# Patient Record
Sex: MALE | Race: WHITE | Employment: FULL TIME | URBAN - METROPOLITAN AREA
[De-identification: names, ages, dates, MRNs, and addresses within clinical notes are randomized per-mention and may not be internally consistent; named-entity substitution may affect disease eponyms.]

---

## 2019-01-03 PROBLEM — E11.9 CONTROLLED TYPE 2 DIABETES MELLITUS WITHOUT COMPLICATION, WITHOUT LONG-TERM CURRENT USE OF INSULIN (HCC): Status: ACTIVE | Noted: 2019-01-03

## 2021-02-23 ENCOUNTER — ANESTHESIA EVENT (OUTPATIENT)
Dept: SURGERY | Age: 56
End: 2021-02-23
Payer: COMMERCIAL

## 2021-02-24 ENCOUNTER — ANESTHESIA (OUTPATIENT)
Dept: SURGERY | Age: 56
End: 2021-02-24
Payer: COMMERCIAL

## 2021-02-24 ENCOUNTER — HOSPITAL ENCOUNTER (OUTPATIENT)
Age: 56
Setting detail: OUTPATIENT SURGERY
Discharge: HOME | End: 2021-02-24
Attending: ORTHOPAEDIC SURGERY | Admitting: ORTHOPAEDIC SURGERY
Payer: COMMERCIAL

## 2021-02-24 VITALS
SYSTOLIC BLOOD PRESSURE: 129 MMHG | DIASTOLIC BLOOD PRESSURE: 71 MMHG | HEART RATE: 76 BPM | OXYGEN SATURATION: 95 % | TEMPERATURE: 98.1 F | WEIGHT: 203 LBS | RESPIRATION RATE: 14 BRPM | BODY MASS INDEX: 27.53 KG/M2

## 2021-02-24 LAB
GLUCOSE BLD STRIP.AUTO-MCNC: 165 MG/DL (ref 65–100)
GLUCOSE BLD STRIP.AUTO-MCNC: 180 MG/DL (ref 65–100)
POTASSIUM BLD-SCNC: 3.8 MMOL/L (ref 3.5–5.1)

## 2021-02-24 PROCEDURE — 74011250636 HC RX REV CODE- 250/636: Performed by: ANESTHESIOLOGY

## 2021-02-24 PROCEDURE — 77030040361 HC SLV COMPR DVT MDII -B: Performed by: ORTHOPAEDIC SURGERY

## 2021-02-24 PROCEDURE — 77030003602 HC NDL NRV BLK BBMI -B: Performed by: NURSE ANESTHETIST, CERTIFIED REGISTERED

## 2021-02-24 PROCEDURE — 82962 GLUCOSE BLOOD TEST: CPT

## 2021-02-24 PROCEDURE — 77030039034 HC DIL ENDO DISP HEALICOIL SN -C: Performed by: ORTHOPAEDIC SURGERY

## 2021-02-24 PROCEDURE — 77030018673: Performed by: ORTHOPAEDIC SURGERY

## 2021-02-24 PROCEDURE — 77030010427: Performed by: ORTHOPAEDIC SURGERY

## 2021-02-24 PROCEDURE — 77030040936 HC WND COBLATN S&N -C: Performed by: ORTHOPAEDIC SURGERY

## 2021-02-24 PROCEDURE — 74011250636 HC RX REV CODE- 250/636: Performed by: ORTHOPAEDIC SURGERY

## 2021-02-24 PROCEDURE — C1713 ANCHOR/SCREW BN/BN,TIS/BN: HCPCS | Performed by: ORTHOPAEDIC SURGERY

## 2021-02-24 PROCEDURE — 2709999900 HC NON-CHARGEABLE SUPPLY: Performed by: ORTHOPAEDIC SURGERY

## 2021-02-24 PROCEDURE — 77030040922 HC BLNKT HYPOTHRM STRY -A: Performed by: ANESTHESIOLOGY

## 2021-02-24 PROCEDURE — 77030010430: Performed by: ORTHOPAEDIC SURGERY

## 2021-02-24 PROCEDURE — 77030027384 HC PRB ARTHSCP SERFAS STRY -C: Performed by: ORTHOPAEDIC SURGERY

## 2021-02-24 PROCEDURE — 77030002933 HC SUT MCRYL J&J -A: Performed by: ORTHOPAEDIC SURGERY

## 2021-02-24 PROCEDURE — 74011250637 HC RX REV CODE- 250/637: Performed by: ANESTHESIOLOGY

## 2021-02-24 PROCEDURE — 74011250636 HC RX REV CODE- 250/636: Performed by: NURSE ANESTHETIST, CERTIFIED REGISTERED

## 2021-02-24 PROCEDURE — 77030033005 HC TBNG ARTHSC PMP STRY -B: Performed by: ORTHOPAEDIC SURGERY

## 2021-02-24 PROCEDURE — 77030006668 HC BLD SHV MENSCS STRY -B: Performed by: ORTHOPAEDIC SURGERY

## 2021-02-24 PROCEDURE — 77030019605: Performed by: ORTHOPAEDIC SURGERY

## 2021-02-24 PROCEDURE — 77030010509 HC AIRWY LMA MSK TELE -A: Performed by: NURSE ANESTHETIST, CERTIFIED REGISTERED

## 2021-02-24 PROCEDURE — 76942 ECHO GUIDE FOR BIOPSY: CPT | Performed by: ORTHOPAEDIC SURGERY

## 2021-02-24 PROCEDURE — 77030020274 HC MISC IMPL ORTHOPEDIC: Performed by: ORTHOPAEDIC SURGERY

## 2021-02-24 PROCEDURE — 76010010054 HC POST OP PAIN BLOCK: Performed by: ORTHOPAEDIC SURGERY

## 2021-02-24 PROCEDURE — 84132 ASSAY OF SERUM POTASSIUM: CPT

## 2021-02-24 PROCEDURE — 76210000006 HC OR PH I REC 0.5 TO 1 HR: Performed by: ORTHOPAEDIC SURGERY

## 2021-02-24 PROCEDURE — 77030004453 HC BUR SHV STRY -B: Performed by: ORTHOPAEDIC SURGERY

## 2021-02-24 PROCEDURE — 74011000250 HC RX REV CODE- 250: Performed by: NURSE ANESTHETIST, CERTIFIED REGISTERED

## 2021-02-24 PROCEDURE — 77030003666 HC NDL SPINAL BD -A: Performed by: ORTHOPAEDIC SURGERY

## 2021-02-24 PROCEDURE — 77030019908 HC STETH ESOPH SIMS -A: Performed by: ANESTHESIOLOGY

## 2021-02-24 PROCEDURE — 76060000033 HC ANESTHESIA 1 TO 1.5 HR: Performed by: ORTHOPAEDIC SURGERY

## 2021-02-24 PROCEDURE — 74011636637 HC RX REV CODE- 636/637: Performed by: ANESTHESIOLOGY

## 2021-02-24 PROCEDURE — 74011250636 HC RX REV CODE- 250/636: Performed by: PHYSICIAN ASSISTANT

## 2021-02-24 PROCEDURE — 77030041183 HC KT ACCESS POS ACUFX SN -B: Performed by: ORTHOPAEDIC SURGERY

## 2021-02-24 PROCEDURE — 76210000021 HC REC RM PH II 0.5 TO 1 HR: Performed by: ORTHOPAEDIC SURGERY

## 2021-02-24 PROCEDURE — 77030002960 HC SUT PASS S&N -C: Performed by: ORTHOPAEDIC SURGERY

## 2021-02-24 PROCEDURE — 76010000161 HC OR TIME 1 TO 1.5 HR INTENSV-TIER 1: Performed by: ORTHOPAEDIC SURGERY

## 2021-02-24 DEVICE — HEALICOIL KNOTLESS RGNST
Type: IMPLANTABLE DEVICE | Site: SHOULDER | Status: FUNCTIONAL
Brand: HEALICOIL

## 2021-02-24 RX ORDER — PROPOFOL 10 MG/ML
INJECTION, EMULSION INTRAVENOUS AS NEEDED
Status: DISCONTINUED | OUTPATIENT
Start: 2021-02-24 | End: 2021-02-24 | Stop reason: HOSPADM

## 2021-02-24 RX ORDER — HYDROCODONE BITARTRATE AND ACETAMINOPHEN 5; 325 MG/1; MG/1
1 TABLET ORAL AS NEEDED
Status: DISCONTINUED | OUTPATIENT
Start: 2021-02-24 | End: 2021-02-24 | Stop reason: HOSPADM

## 2021-02-24 RX ORDER — EPHEDRINE SULFATE/0.9% NACL/PF 50 MG/5 ML
SYRINGE (ML) INTRAVENOUS AS NEEDED
Status: DISCONTINUED | OUTPATIENT
Start: 2021-02-24 | End: 2021-02-24 | Stop reason: HOSPADM

## 2021-02-24 RX ORDER — SODIUM CHLORIDE 0.9 % (FLUSH) 0.9 %
5-40 SYRINGE (ML) INJECTION EVERY 8 HOURS
Status: DISCONTINUED | OUTPATIENT
Start: 2021-02-24 | End: 2021-02-24 | Stop reason: HOSPADM

## 2021-02-24 RX ORDER — CEFAZOLIN SODIUM/WATER 2 G/20 ML
2 SYRINGE (ML) INTRAVENOUS ONCE
Status: COMPLETED | OUTPATIENT
Start: 2021-02-24 | End: 2021-02-24

## 2021-02-24 RX ORDER — LIDOCAINE HYDROCHLORIDE 10 MG/ML
0.1 INJECTION INFILTRATION; PERINEURAL AS NEEDED
Status: DISCONTINUED | OUTPATIENT
Start: 2021-02-24 | End: 2021-02-24 | Stop reason: HOSPADM

## 2021-02-24 RX ORDER — ONDANSETRON 2 MG/ML
INJECTION INTRAMUSCULAR; INTRAVENOUS AS NEEDED
Status: DISCONTINUED | OUTPATIENT
Start: 2021-02-24 | End: 2021-02-24 | Stop reason: HOSPADM

## 2021-02-24 RX ORDER — ACETAMINOPHEN 500 MG
1000 TABLET ORAL ONCE
Status: COMPLETED | OUTPATIENT
Start: 2021-02-24 | End: 2021-02-24

## 2021-02-24 RX ORDER — SODIUM CHLORIDE, SODIUM LACTATE, POTASSIUM CHLORIDE, CALCIUM CHLORIDE 600; 310; 30; 20 MG/100ML; MG/100ML; MG/100ML; MG/100ML
150 INJECTION, SOLUTION INTRAVENOUS CONTINUOUS
Status: DISCONTINUED | OUTPATIENT
Start: 2021-02-24 | End: 2021-02-24 | Stop reason: HOSPADM

## 2021-02-24 RX ORDER — HYDROMORPHONE HYDROCHLORIDE 2 MG/ML
0.5 INJECTION, SOLUTION INTRAMUSCULAR; INTRAVENOUS; SUBCUTANEOUS
Status: DISCONTINUED | OUTPATIENT
Start: 2021-02-24 | End: 2021-02-24 | Stop reason: HOSPADM

## 2021-02-24 RX ORDER — EPINEPHRINE 1 MG/ML
INJECTION, SOLUTION, CONCENTRATE INTRAVENOUS AS NEEDED
Status: DISCONTINUED | OUTPATIENT
Start: 2021-02-24 | End: 2021-02-24 | Stop reason: HOSPADM

## 2021-02-24 RX ORDER — DEXAMETHASONE SODIUM PHOSPHATE 4 MG/ML
INJECTION, SOLUTION INTRA-ARTICULAR; INTRALESIONAL; INTRAMUSCULAR; INTRAVENOUS; SOFT TISSUE AS NEEDED
Status: DISCONTINUED | OUTPATIENT
Start: 2021-02-24 | End: 2021-02-24 | Stop reason: HOSPADM

## 2021-02-24 RX ORDER — DEXAMETHASONE SODIUM PHOSPHATE 4 MG/ML
INJECTION, SOLUTION INTRA-ARTICULAR; INTRALESIONAL; INTRAMUSCULAR; INTRAVENOUS; SOFT TISSUE
Status: COMPLETED | OUTPATIENT
Start: 2021-02-24 | End: 2021-02-24

## 2021-02-24 RX ORDER — SODIUM CHLORIDE 9 MG/ML
50 INJECTION, SOLUTION INTRAVENOUS CONTINUOUS
Status: DISCONTINUED | OUTPATIENT
Start: 2021-02-24 | End: 2021-02-24 | Stop reason: HOSPADM

## 2021-02-24 RX ORDER — FAMOTIDINE 20 MG/1
20 TABLET, FILM COATED ORAL ONCE
Status: COMPLETED | OUTPATIENT
Start: 2021-02-24 | End: 2021-02-24

## 2021-02-24 RX ORDER — SODIUM CHLORIDE 0.9 % (FLUSH) 0.9 %
5-40 SYRINGE (ML) INJECTION AS NEEDED
Status: DISCONTINUED | OUTPATIENT
Start: 2021-02-24 | End: 2021-02-24 | Stop reason: HOSPADM

## 2021-02-24 RX ORDER — ACETAMINOPHEN 500 MG
1000 TABLET ORAL
Status: DISCONTINUED | OUTPATIENT
Start: 2021-02-24 | End: 2021-02-24 | Stop reason: HOSPADM

## 2021-02-24 RX ORDER — FENTANYL CITRATE 50 UG/ML
100 INJECTION, SOLUTION INTRAMUSCULAR; INTRAVENOUS ONCE
Status: COMPLETED | OUTPATIENT
Start: 2021-02-24 | End: 2021-02-24

## 2021-02-24 RX ORDER — LIDOCAINE HYDROCHLORIDE 20 MG/ML
INJECTION, SOLUTION EPIDURAL; INFILTRATION; INTRACAUDAL; PERINEURAL AS NEEDED
Status: DISCONTINUED | OUTPATIENT
Start: 2021-02-24 | End: 2021-02-24 | Stop reason: HOSPADM

## 2021-02-24 RX ORDER — MIDAZOLAM HYDROCHLORIDE 1 MG/ML
2 INJECTION, SOLUTION INTRAMUSCULAR; INTRAVENOUS
Status: COMPLETED | OUTPATIENT
Start: 2021-02-24 | End: 2021-02-24

## 2021-02-24 RX ADMIN — ACETAMINOPHEN 1000 MG: 500 TABLET ORAL at 09:03

## 2021-02-24 RX ADMIN — DEXAMETHASONE SODIUM PHOSPHATE 4 MG: 4 INJECTION, SOLUTION INTRAMUSCULAR; INTRAVENOUS at 10:11

## 2021-02-24 RX ADMIN — MIDAZOLAM 2 MG: 1 INJECTION INTRAMUSCULAR; INTRAVENOUS at 10:05

## 2021-02-24 RX ADMIN — LIDOCAINE HYDROCHLORIDE 100 MG: 20 INJECTION, SOLUTION EPIDURAL; INFILTRATION; INTRACAUDAL; PERINEURAL at 11:10

## 2021-02-24 RX ADMIN — ONDANSETRON 4 MG: 2 INJECTION INTRAMUSCULAR; INTRAVENOUS at 11:23

## 2021-02-24 RX ADMIN — ROPIVACAINE HYDROCHLORIDE 30 ML: 5 INJECTION, SOLUTION EPIDURAL; INFILTRATION; PERINEURAL at 10:11

## 2021-02-24 RX ADMIN — PROPOFOL 200 MG: 10 INJECTION, EMULSION INTRAVENOUS at 11:10

## 2021-02-24 RX ADMIN — INSULIN HUMAN 3 UNITS: 100 INJECTION, SOLUTION PARENTERAL at 09:24

## 2021-02-24 RX ADMIN — SODIUM CHLORIDE, SODIUM LACTATE, POTASSIUM CHLORIDE, AND CALCIUM CHLORIDE 150 ML/HR: 600; 310; 30; 20 INJECTION, SOLUTION INTRAVENOUS at 09:03

## 2021-02-24 RX ADMIN — DEXAMETHASONE SODIUM PHOSPHATE 5 MG: 4 INJECTION, SOLUTION INTRAMUSCULAR; INTRAVENOUS at 11:23

## 2021-02-24 RX ADMIN — CEFAZOLIN 2 G: 1 INJECTION, POWDER, FOR SOLUTION INTRAVENOUS at 11:25

## 2021-02-24 RX ADMIN — Medication 10 MG: at 11:31

## 2021-02-24 RX ADMIN — FENTANYL CITRATE 100 MCG: 50 INJECTION, SOLUTION INTRAMUSCULAR; INTRAVENOUS at 10:05

## 2021-02-24 RX ADMIN — FAMOTIDINE 20 MG: 20 TABLET, FILM COATED ORAL at 09:03

## 2021-02-24 NOTE — PROGRESS NOTES
's pre-procedure visit requested by patient. Conveyed care and concern for patient and family. Offered prayer as requested for patient, family, and staff.     Marvin Dias MDiv, BS  Board Certified

## 2021-02-24 NOTE — PERIOP NOTES
PACU DISCHARGE NOTE  Vital signs stable, pain well controlled, alert and oriented times three or at baseline, no anesthetic complications. IV removed with catheter tip intact. patient dressed with sling over clothing per patient preference. Written and verbal discharge instructions given, including pain control, dressing care and follow up appointment. Oralia Del Valle verbalized understanding and signed discharge instructions. All questions answered prior to discharge. Dr Michael Thayer okay to discharge at this time. Pt and all belongings taken via wheelchair and safely put in vehicle.

## 2021-02-24 NOTE — H&P
Outpatient Surgery History and Physical:  Jonny Ratliff was seen and examined. CHIEF COMPLAINT:   Left shoulder pain. PE:     Visit Vitals  /70 (BP 1 Location: Right upper arm, BP Patient Position: At rest)   Pulse 73   Temp 98.1 °F (36.7 °C)   Resp 17   Wt 92.1 kg (203 lb)   SpO2 99%   BMI 27.53 kg/m²       Heart:   Regular rhythm      Lungs:  Are clear      Past Medical History:    Patient Active Problem List    Diagnosis    Controlled type 2 diabetes mellitus without complication, without long-term current use of insulin (HCC)    Hyperlipidemia    HTN (hypertension)       Surgical History:   Past Surgical History:   Procedure Laterality Date    HX COLONOSCOPY  last 4/4/16    Agnieszka--no polyps--7-10 year recall    HX LIPOMA RESECTION      arm and neck       Social History: Patient  reports that he has never smoked. He has never used smokeless tobacco. He reports that he does not drink alcohol or use drugs. Family History:   Family History   Problem Relation Age of Onset    Hypertension Mother     Cancer Father         liver    Hypertension Sister     Cancer Maternal Aunt         breast 62s    Cancer Maternal Grandmother         liver    Cancer Maternal Aunt         pancreatic    Heart Disease Neg Hx     Diabetes Neg Hx        Allergies: Reviewed per EMR  Allergies   Allergen Reactions    Jardiance [Empagliflozin] Other (comments)     Severe UTI    Metformin Diarrhea     PT DENIES       Medications:    No current facility-administered medications on file prior to encounter. Current Outpatient Medications on File Prior to Encounter   Medication Sig    meloxicam (MOBIC) 7.5 mg tablet Take 1 Tab by mouth two (2) times a day. (Patient taking differently: Take 7.5 mg by mouth daily.)    atorvastatin (LIPITOR) 10 mg tablet Take 1 Tab by mouth daily.  hydroCHLOROthiazide (HYDRODIURIL) 25 mg tablet Take 1 Tab by mouth daily.     metFORMIN ER (GLUCOPHAGE XR) 500 mg tablet Take 3 Tabs by mouth daily (with dinner). (Patient taking differently: Take 1,500 mg by mouth daily.)    liraglutide (VICTOZA) 0.6 mg/0.1 mL (18 mg/3 mL) pnij 1.8 mg by SubCUTAneous route nightly. Indications: type 2 diabetes mellitus    Insulin Needles, Disposable, (Ayana Pen Needle) 32 gauge x 5/32\" ndle 0.3 cc of victoza a day    valsartan (DIOVAN) 160 mg tablet Take 1 Tab by mouth daily.  aspirin delayed-release 81 mg tablet Take  by mouth daily.  sildenafil citrate (VIAGRA) 100 mg tablet Take one a day as needed       The surgery is planned for the left shoulder arthroscopy rotator cuff repair. History and physical has been reviewed. The patient has been examined. There have been no significant clinical changes since the completion of the originally dated History and Physical.  Patient identified by surgeon; surgical site was confirmed by patient and surgeon. The patient is here today for outpatient surgery. I have examined the patient, no changes are noted in the patient's medical status. Necessity for the procedure/care is still present and the history and physical above is current. See the office notes for the full long term history of the problem. Please see the recent office notes for the musculoskeletal examination.     Signed By: ELISA Lopez     February 24, 2021 9:33 AM

## 2021-02-24 NOTE — ANESTHESIA PROCEDURE NOTES
Peripheral Block    Start time: 2/24/2021 10:05 AM  End time: 2/24/2021 10:11 AM  Performed by: Clau Moore MD  Authorized by: Clau Moore MD       Pre-procedure: Indications: at surgeon's request and post-op pain management    Preanesthetic Checklist: patient identified, risks and benefits discussed, site marked, timeout performed, anesthesia consent given and patient being monitored    Timeout Time: 10:05          Block Type:   Block Type: Interscalene  Laterality:  Left  Monitoring:  Standard ASA monitoring, responsive to questions, oxygen, continuous pulse ox, frequent vital sign checks and heart rate  Injection Technique:  Single shot  Procedures: ultrasound guided and nerve stimulator    Patient Position: seated (lateral decubitus)  Prep: chlorhexidine    Location:  Interscalene  Needle Type:  Stimuplex  Needle Gauge:  22 G  Needle Localization:  Nerve stimulator and ultrasound guidance  Motor Response comment:   Motor Response: minimal motor response >0.4 mA   Medication Injected:  Ropivacaine 0.5% with epinephrine 1:200,000 injection, 30 mL (Mixture components: EPINEPHrine HCl (PF) 1 mg/mL (1 mL) Soln, . 005 mL; ropivacaine (PF) 5 mg/mL (0.5 %) Soln, 1 mL)  dexamethasone (DECADRON) 4 mg/mL injection, 4 mg  Med Admin Time: 2/24/2021 10:11 AM    Assessment:  Number of attempts:  1  Injection Assessment:  Incremental injection every 5 mL, local visualized surrounding nerve on ultrasound, negative aspiration for blood, no intravascular symptoms, no paresthesia and ultrasound image on chart  Patient tolerance:  Patient tolerated the procedure well with no immediate complications  All needles out intact, proc. Tolerated well.

## 2021-02-24 NOTE — ANESTHESIA POSTPROCEDURE EVALUATION
Procedure(s):  LEFT SHOULDER ARTHROSCOPY ROTATOR CUFF REPAIR. general    Anesthesia Post Evaluation      Multimodal analgesia: multimodal analgesia used between 6 hours prior to anesthesia start to PACU discharge  Patient location during evaluation: bedside  Patient participation: complete - patient participated  Level of consciousness: awake and alert  Pain management: adequate  Airway patency: patent  Anesthetic complications: no  Cardiovascular status: hemodynamically stable  Respiratory status: spontaneous ventilation  Hydration status: euvolemic  Comments: Patient stable and may discharge at this time. Post anesthesia nausea and vomiting:  none  Final Post Anesthesia Temperature Assessment:  Normothermia (36.0-37.5 degrees C)    Good result with interscalene nerve block.     INITIAL Post-op Vital signs:   Vitals Value Taken Time   /75 02/24/21 1303   Temp 36.8 °C (98.2 °F) 02/24/21 1223   Pulse 80 02/24/21 1303   Resp 13 02/24/21 1303   SpO2 94 % 02/24/21 1303

## 2021-02-24 NOTE — ANESTHESIA PREPROCEDURE EVALUATION
Anesthetic History   No history of anesthetic complications       Comments: Slow awakening     Review of Systems / Medical History  Patient summary reviewed and pertinent labs reviewed    Pulmonary  Within defined limits                 Neuro/Psych   Within defined limits           Cardiovascular    Hypertension: well controlled          Hyperlipidemia    Exercise tolerance: >4 METS     GI/Hepatic/Renal  Within defined limits              Endo/Other    Diabetes: type 2    Obesity     Other Findings              Physical Exam    Airway  Mallampati: II  TM Distance: > 6 cm  Neck ROM: normal range of motion   Mouth opening: Normal     Cardiovascular  Regular rate and rhythm,  S1 and S2 normal,  no murmur, click, rub, or gallop    Rate: normal         Dental  No notable dental hx       Pulmonary  Breath sounds clear to auscultation               Abdominal         Other Findings            Anesthetic Plan    ASA: 2  Anesthesia type: general      Post-op pain plan if not by surgeon: peripheral nerve block single    Induction: Intravenous  Anesthetic plan and risks discussed with: Patient and Spouse      covid in dec.

## 2021-02-25 NOTE — OP NOTES
[facility]  OPERATIVE REPORT    Name:  Catalina Shaikh  MR#:  [id number]  :  1965  ACCOUNT #:  [de-identified]  DATE OF SERVICE:  2021    PREOPERATIVE DIAGNOSES:  1. Rotator cuff tear. 2.  Acromioclavicular arthritis. 3.  Impingement, left shoulder. POSTOPERATIVE DIAGNOSES:  1. Rotator cuff tear. 2.  Acromioclavicular arthritis. 3.  Impingement, left shoulder. PROCEDURE PERFORMED:  1. Arthroscopic rotator cuff repair - CPT 65909.  2.  Arthroscopic distal clavicle excision (Harbert procedure) - CPT 88584.  3.  Arthroscopic subacromial decompression - CPT 93248, left shoulder. SURGEON:  Irene Vernon MD    ASSISTANT:  ELISA Trujillo    ANESTHESIA:  General.    FLUIDS:  Crystalloid. COMPLICATIONS:  None. SPECIMENS REMOVED:  None. IMPLANTS:  Yes, see record. ESTIMATED BLOOD LOSS:  Minimal.    FINDINGS:  There was a high-grade partial tear with a near full-thickness component of the supraspinatus in a 1-cm area with tearing on both the bursal and articular surfaces. There was an anterior-inferior acromial slope. There was undersurface osteophyte formation and degenerative change of the distal clavicle at the [facility] joint. No other abnormalities were seen. PROCEDURE:  After informed consent, the patient was brought to the operating room and placed on the operating room table in the supine position. General anesthesia was administered without difficulty. The patient was placed in the lateral decubitus position. All pressure points were inspected and padded appropriately. The left upper extremity was suspended by traction. The left shoulder was prepped and draped in sterile fashion. Glenohumeral joint was insufflated with 50 mL of sterile saline and a posterior portal was established. Glenohumeral joint was then arthroscoped in a sequential manner. The aforementioned findings were noted. An anterior portal was established.   Undersurface rotator cuff tearing was debrided back to smooth stable area. Scope was brought into the subacromial space. A lateral portal was established. Bursal proliferative tissue was excised. The undersurface of the acromion was exposed and an acromioplasty was performed. All of the acromion anterior to the leading edge of the distal clavicle was excised. A depth of 5-6 mm in a 2-cm anterior to posterior direction was removed. This opened up the subacromial space nicely. Attention was then directed to the distal clavicle. Through both the anterior and lateral portals, a circumferential distal clavicle excision was performed. A 10 mm of distal clavicle was excised. Circumferential excision was verified arthroscopically. The Shalonda procedure was performed without difficulty. Attention was then directed to the rotator cuff. The high-grade partial tear was converted to a full-thickness tear. The area underneath the original insertion was lightly decorticated. A HealiCoil anchor and two tape suture in a mattress fashion were used to anatomically repair the tear. Complete repair of the rotator cuff was performed. The rotator cuff tear was repaired without difficulty. The shoulder was thoroughly irrigated. Portals were closed. Sterile dressings were applied. The patient was taken to the recovery room in stable condition. ELISA Loco, assisted during the procedure. He was necessary for patient positioning, wound closure, and assistance with the major portions of the operation. His presence decreased the operative time and potential complication rate.       MD TIM [id number]  D:  02/25/2021 8:33  T:  02/25/2021 9:08  JOB #:  [id number]

## 2021-03-08 ENCOUNTER — HOSPITAL ENCOUNTER (OUTPATIENT)
Dept: PHYSICAL THERAPY | Age: 56
Discharge: HOME | End: 2021-03-08
Payer: COMMERCIAL

## 2021-03-08 PROCEDURE — 97161 PT EVAL LOW COMPLEX 20 MIN: CPT

## 2021-03-08 PROCEDURE — 97110 THERAPEUTIC EXERCISES: CPT

## 2021-03-08 NOTE — THERAPY EVALUATION
Lynne Harmon  : 1965  Primary: Scarlet Payan Of Shawna Hanks*  Secondary:  Omid Castro @ [address], [place].  [phone]   [phone]       OUTPATIENT PHYSICAL THERAPY:Initial Assessment 3/8/2021   ICD-10: Treatment Diagnosis: Pain in left shoulder (M25.512) and Stiffness of left shoulder, not elsewhere classified (M25.612)  Precautions/Allergies:   Jardiance [empagliflozin] and Metformin     Ambulatory/Rehab Services H2 Model Falls Risk Assessment    Risk Factors:       (1)  Gender [Male] Ability to Rise from Chair:       (0)  Ability to rise in a single movement    Falls Prevention Plan:       Physical Limitations to Exercise (specify):  precautions s/p L RCR   Total: (5 or greater = High Risk): 1     [facility] [employer]. All Rights Reserved. [facility] Center[facility] Patent #[id number]. Federal Law prohibits the replication, distribution or use without written permission from [employer]      MEDICAL/REFERRING DIAGNOSIS:  Status post left rotator cuff repair [Z98.890]   DATE OF SURGERY: 21  REFERRING PHYSICIAN: Lin Sandifer, MD MD Orders: evaluate and treat  Return MD Appointment: 3/11/21   INITIAL ASSESSMENT:  Mr. Jarocho Bernardo presents with impaired strength, ROM and pain of left shoulder  secondary to RCR. This limits reaching and lifting tolerance and restricts ability to participate in ADLs and recreational activities. . Patient would benefit from skilled physical therapy to address above impairments. PROBLEM LIST (Impacting functional limitations):  1. Decreased Strength  2. Decreased ADL/Functional Activities  3. Increased Pain  4. Decreased Activity Tolerance  5. Decreased Flexibility/Joint Mobility INTERVENTIONS PLANNED: (Treatment may consist of any combination of the following)  1. Cryotherapy  2. Electrical Stimulation  3. Home Exercise Program (HEP)  4. Manual Therapy  5.  Neuromuscular Re-education/Strengthening  6. Therapeutic Activites  7. Therapeutic Exercise/Strengthening   TREATMENT PLAN:  Effective Dates: 3/8/2021 TO 6/6/2021 (90 days). Frequency/Duration: 2 times a week for 90 Day(s)  GOALS: (Goals have been discussed and agreed upon with patient.)  Short-Term Functional Goals: Time Frame: 4 weeks  # Goal Status   1 Pt will confirm compliance with home program to facilitate improvement in function. NEW     Discharge goals: Time frame: 12 weeks   # Goal Status   1 Pt will be able to elevate UE to at least 130° without symptoms in order to participate in ADLs such as reaching overhead and pulling shirt on. NEW   2 Pt will be able to reach over head to touch upper back without symptoms to be able to participate in ADLs such as reaching overhead and washing head. NEW   3 Pt will be able to reach across body to touch spine of opposite scapula without symptoms to be able to participate in ADLs such as reaching tasks. NEW   4 Pt will be able to reach behind back to touch lumbar region without symptoms to be able to participate in dressing and toileting activities. NEW         Rehabilitation Potential For Stated Goals: Good  Regarding Minor العراقي's therapy, I certify that the treatment plan above will be carried out by a therapist or under their direction. Thank you for this referral,  Isaiah Art, PT, DPT     Referring Physician Signature: Sakina Tovar MD _______________________________ Date _____________     HISTORY:   History of Injury/Illness (Reason for Referral):  Pt reports he has had L shoulder pain for years. It has mostly affected his clavicular region and he had bone scan to screen for tumor but this was negative. He continued to have pain and eventually had MRI after seeing ortho. MRI revealed RC tear and he had surgery on 2/24/21. He reports he used ice machine and pain medication for first 3-4 days and then weaned from them both and has done well since.  He is able to don/doff sling with no assistance though requires help dressing still. He denies radicular symptoms in L UE. 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 He would like to get back to normal ADLs, golf, and working out. Past Medical History/Comorbidities:   Mr. Mali Rock  has a past medical history of Diabetes ([address] (dx 2012), Diverticulosis of large intestine without diverticulitis (2016), Elevated PSA, Hypertension, and Obesity (BMI 30-39.9) (30.6). Mr. Mali Rock  has a past surgical history that includes hx lipoma resection and hx colonoscopy (last 4/4/16). Social History/Living Environment:     Pt lives alone in [facility]. Prior Level of Function/Work/Activity:  Pt works full time at a desk job. Pt had limited prior level of function. Current Medications:       Current Outpatient Medications:     naproxen sodium (Aleve) 220 mg cap, Take  by mouth., Disp: , Rfl:     meloxicam (MOBIC) 7.5 mg tablet, Take 1 Tab by mouth two (2) times a day. (Patient taking differently: Take 7.5 mg by mouth daily.), Disp: 60 Tab, Rfl: 0    atorvastatin (LIPITOR) 10 mg tablet, Take 1 Tab by mouth daily. , Disp: 90 Tab, Rfl: 3    hydroCHLOROthiazide (HYDRODIURIL) 25 mg tablet, Take 1 Tab by mouth daily. , Disp: 90 Tab, Rfl: 3    metFORMIN ER (GLUCOPHAGE XR) 500 mg tablet, Take 3 Tabs by mouth daily (with dinner). (Patient taking differently: Take 1,500 mg by mouth daily.), Disp: 270 Tab, Rfl: 1    liraglutide (VICTOZA) 0.6 mg/0.1 mL (18 mg/3 mL) pnij, 1.8 mg by SubCUTAneous route nightly. Indications: type 2 diabetes mellitus, Disp: 27 mL, Rfl: 1    Insulin Needles, Disposable, (Ayana Pen Needle) 32 gauge x 5/32\" ndle, 0.3 cc of victoza a day, Disp: 100 Pen Needle, Rfl: 3    valsartan (DIOVAN) 160 mg tablet, Take 1 Tab by mouth daily. , Disp: 90 Tab, Rfl: 3    sildenafil citrate (VIAGRA) 100 mg tablet, Take one a day as needed, Disp: 30 Tab, Rfl: 2    aspirin delayed-release 81 mg tablet, Take  by mouth daily. , Disp: , Rfl:    Date Last Reviewed:  3/8/2021    Number of Personal Factors/Comorbidities that affect the Plan of Care: 0: LOW COMPLEXITY   EXAMINATION:   Pain at worst: 10/10  Observation: Incisions healing well. Pt wearing sling with abduction pillow. ROM:    Left Right   Elevation (°) Passive flexion to ~60 ° per visual estimation 133   Behind neck reach Not tested To upper back   Behind back reach Not tested To upper lumbar region   Cross body reach Not tested To opposite scapula    R IR and ER WNL and pain-free. L IR to stomach, L ER to neutral.     UE ANDT: median-bias (+) L  Strength: 5/5 throughout on R. L not tested. Body Structures Involved:  1. Nerves  2. Joints  3. Muscles Body Functions Affected:  1. Sensory/Pain  2. Neuromusculoskeletal  3. Movement Related Activities and Participation Affected:  1. General Tasks and Demands  2. Self Care  3. Community, Social and [place]   Number of elements (examined above) that affect the Plan of Care: 1-2: LOW COMPLEXITY   CLINICAL PRESENTATION:   Presentation: Stable and uncomplicated: LOW COMPLEXITY     CLINICAL DECISION MAKING:      OUTCOME MEASURE:   Initial outcome measure performed on 3/8/2021. Tool Used: Disabilities of the Arm, Shoulder and Hand (DASH) Questionnaire - Quick Version  Score:  Initial: 40/55  Most Recent: X/55 (Date: -- )   Interpretation of Score: The DASH is designed to measure the activities of daily living in person's with upper extremity dysfunction or pain. Each section is scored on a 1-5 scale, 5 representing the greatest disability. The scores of each section are added together for a total score of 55. MEDICAL NECESSITY:   · Patient will benefit from skilled physical therapy to address their previously listed impairments in order to improve their independence with functional activities painfree.   REASON FOR SERVICES/OTHER COMMENTS:  · Patient requires present interventions due to patient's inability to perform functional and recreational activities painfree.    Use of outcome tool(s) and clinical judgement create a POC that gives a: Clear prediction of patient's progress: LOW COMPLEXITY        Total Duration: 38 min  PT Patient Time In/Time Out  Time In: 1530  Time Out: [address], PT, DPT

## 2021-03-08 NOTE — PROGRESS NOTES
Tamika Finley  : 1965  Primary: Adela Rain Of Shawna Hanks*  Secondary:  [address] @ [address], [place].  [phone]   [phone]      OUTPATIENT PHYSICAL THERAPY: Daily Treatment Note 3/8/2021  Date of surgery: 21  Visit Count:  1    ICD-10: Treatment Diagnosis: Pain in left shoulder (M25.512) and Stiffness of left shoulder, not elsewhere classified (M25.612)  TREATMENT PLAN:  Effective Dates: 3/8/2021 TO 2021 (90 days). Frequency/Duration: 2 times a week for 90 Day(s)  GOALS: (Goals have been discussed and agreed upon with patient.)  Short-Term Functional Goals: Time Frame: 4 weeks  # Goal Status   1 Pt will confirm compliance with home program to facilitate improvement in function. NEW     Discharge goals: Time frame: 12 weeks   # Goal Status   1 Pt will be able to elevate UE to at least 130° without symptoms in order to participate in ADLs such as reaching overhead and pulling shirt on. NEW   2 Pt will be able to reach over head to touch upper back without symptoms to be able to participate in ADLs such as reaching overhead and washing head. NEW   3 Pt will be able to reach across body to touch spine of opposite scapula without symptoms to be able to participate in ADLs such as reaching tasks. NEW   4 Pt will be able to reach behind back to touch lumbar region without symptoms to be able to participate in dressing and toileting activities. NEW          Pre-treatment Symptoms/Complaints:  Pt reports shoulder feels okay at rest though painful with movement. Pain: Initial:   3/10 Post Session:  No increase   Medications Last Reviewed: 3/8/2021  Updated Objective Findings: Below measures from initial evaluation unless otherwise noted. 3/8/21  Pain at worst: 10/10  Observation: Incisions healing well. Pt wearing sling with abduction pillow.   ROM:    Left Right   Elevation (°) Passive flexion to ~60 ° per visual estimation 133   Behind neck reach Not tested To upper back   Behind back reach Not tested To upper lumbar region   Cross body reach Not tested To opposite scapula    R IR and ER WNL and pain-free. L IR to stomach, L ER to neutral.     UE ANDT: median-bias (+) L  Strength: 5/5 throughout on R. L not tested. DASH: 40/55. TREATMENT:   THERAPEUTIC ACTIVITY: (see chart for minutes): Therapeutic activities per grid below to improve mobility, strength, balance and coordination. Required minimal visual, verbal and tactile cues to improve independence with functional mobility and activities. THERAPEUTIC EXERCISE: (see chart for minutes):  Exercises per grid below to improve mobility, strength, balance and coordination. Required minimal visual, verbal and tactile cues to promote proper body alignment and promote proper body mechanics. Progressed resistance, range, repetitions and complexity of movement as indicated. NEUROMUSCULAR RE-EDUCATION: (see chart for minutes):  Exercise/activities per grid below to improve balance, coordination, kinesthetic sense, posture, pain, and proprioception. Required minimal visual, verbal and tactile cues to promote motor control of left, upper extremity(s). MANUAL THERAPY: (see chart for minutes): Joint mobilization, Soft tissue mobilization, skin mobilization, and muscle energy techniques were utilized and necessary because of the patient's restricted joint motion, restricted motion of soft tissue and pain . MODALITIES: (see chart for minutes):      *  Cold Pack Therapy in order to provide analgesia and reduce inflammation and edema.      Date: 3/8/21 (visit 1)       Modalities:                Therapeutic Exercise: 23 min        Pendulum: x10 CW/CCW  Elbow flexion/extension: x10 L  Shoulder circles: x10 forwards and backwards   Pulley flexion: x10   Shoulder isometric: x1 hold all directions   PROM: x5' into ER and flexion   Pt educated on home program implementation and given printout. Neuromuscular re-education                Manual Therapy:                Therapeutic Activities:                  Home program: 3/8/21: pendulum, pulley, elbow flexion/extension, shoulder circles, shoulder isometrics    MedBridge Portal  Treatment/Session Summary:    · Response to Treatment: Pt tolerated exercises well and reported he understood them all. · Communication/Consultation:  None today  · Equipment provided today: 3/8/21: doorway pulley  · Recommendations/Intent for next treatment session: Next visit will focus on improving L shoulder pain and ROM.     Total Treatment Billable Duration:  38 minutes  PT Patient Time In/Time Out  Time In: 4595  Time Out: 7400 STAN Gonzalez, PT, DPT    Future Appointments   Date Time Provider Margo Cheni   3/12/2021  2:00 PM Vi De [facility]   3/16/2021 10:15 AM Vi [facility]   3/18/2021 10:15 AM Vi HODGESOFR [facility]   3/23/2021 10:15 AM Vi HODGESOFMARY [facility]   3/25/2021 10:15 AM Vi HODGESOFR [facility]   3/30/2021 10:15 AM Vi HODGESOFR [facility]   4/2/2021  8:00 AM Rodriguez Lal MD [facility]

## 2021-03-12 ENCOUNTER — HOSPITAL ENCOUNTER (OUTPATIENT)
Dept: PHYSICAL THERAPY | Age: 56
Discharge: HOME | End: 2021-03-12
Payer: COMMERCIAL

## 2021-03-12 PROCEDURE — 97016 VASOPNEUMATIC DEVICE THERAPY: CPT

## 2021-03-12 PROCEDURE — 97110 THERAPEUTIC EXERCISES: CPT

## 2021-03-12 NOTE — PROGRESS NOTES
Mark Newman  : 1965  Primary: Amanda Robert Of [facility]a [facility]anks*  Secondary:  Nohemi Lynn @ [address], [place].  [phone]   [phone]      OUTPATIENT PHYSICAL THERAPY: Daily Treatment Note 3/12/2021  Date of surgery: 21  Visit Count:  2    ICD-10: Treatment Diagnosis: Pain in left shoulder (M25.512) and Stiffness of left shoulder, not elsewhere classified (M25.612)  TREATMENT PLAN:  Effective Dates: 3/8/2021 TO 2021 (90 days). Frequency/Duration: 2 times a week for 90 Day(s)  GOALS: (Goals have been discussed and agreed upon with patient.)  Short-Term Functional Goals: Time Frame: 4 weeks  # Goal Status   1 Pt will confirm compliance with home program to facilitate improvement in function. NEW     Discharge goals: Time frame: 12 weeks   # Goal Status   1 Pt will be able to elevate UE to at least 130° without symptoms in order to participate in ADLs such as reaching overhead and pulling shirt on. NEW   2 Pt will be able to reach over head to touch upper back without symptoms to be able to participate in ADLs such as reaching overhead and washing head. NEW   3 Pt will be able to reach across body to touch spine of opposite scapula without symptoms to be able to participate in ADLs such as reaching tasks. NEW   4 Pt will be able to reach behind back to touch lumbar region without symptoms to be able to participate in dressing and toileting activities. NEW          Pre-treatment Symptoms/Complaints:  Pt reports he saw MD earlier this week who removed the steri strips and told him to wean from abduction pillow. Pain: Initial:   Pt reports shoulder is a little sore but generally feels okay while in sling Post Session:  No increase   Medications Last Reviewed: 3/12/2021  Updated Objective Findings: Below measures from initial evaluation unless otherwise noted. 3/8/21  Pain at worst: 10/10  Observation: Incisions healing well.  Pt wearing sling with abduction pillow. ROM:    Left Right   Elevation (°) Passive flexion to ~60 ° per visual estimation 133   Behind neck reach Not tested To upper back   Behind back reach Not tested To upper lumbar region   Cross body reach Not tested To opposite scapula    R IR and ER WNL and pain-free. L IR to stomach, L ER to neutral.     UE ANDT: median-bias (+) L  Strength: 5/5 throughout on R. L not tested. DASH: 40/55. TREATMENT:   THERAPEUTIC ACTIVITY: (see chart for minutes): Therapeutic activities per grid below to improve mobility, strength, balance and coordination. Required minimal visual, verbal and tactile cues to improve independence with functional mobility and activities. THERAPEUTIC EXERCISE: (see chart for minutes):  Exercises per grid below to improve mobility, strength, balance and coordination. Required minimal visual, verbal and tactile cues to promote proper body alignment and promote proper body mechanics. Progressed resistance, range, repetitions and complexity of movement as indicated. NEUROMUSCULAR RE-EDUCATION: (see chart for minutes):  Exercise/activities per grid below to improve balance, coordination, kinesthetic sense, posture, pain, and proprioception. Required minimal visual, verbal and tactile cues to promote motor control of left, upper extremity(s). MANUAL THERAPY: (see chart for minutes): Joint mobilization, Soft tissue mobilization, skin mobilization, and muscle energy techniques were utilized and necessary because of the patient's restricted joint motion, restricted motion of soft tissue and pain . MODALITIES: (see chart for minutes):      *  Cold Pack Therapy in order to provide analgesia and reduce inflammation and edema.      Date: 3/8/21 (visit 1) 3/12/21 (visit 2)       Modalities:  15 min      Game ready to L shoulder, medium compression, coldest setting   15'      Therapeutic Exercise: 23 min 35 min       Pendulum: x10 CW/CCW  Elbow flexion/extension: x10 L  Shoulder circles: x10 forwards and backwards   Pulley flexion: x10   Shoulder isometric: x1 hold all directions   PROM: x5' into ER and flexion   Pt educated on home program implementation and given printout. Pendulum: x10 CW/CCW  Elbow flexion/extension: 2x10 L  Shoulder circles: 2x10  Pulley flexion: 2'   Shoulder isometric: 2x1' hold all directions   PROM: x10' into ER and flexion       Neuromuscular re-education                Manual Therapy:                Therapeutic Activities:                  Home program: 3/8/21: pendulum, pulley, elbow flexion/extension, shoulder circles, shoulder isometrics    MedBridge Portal  Treatment/Session Summary:    · Response to Treatment: Pt tolerated exercises well and weaning from abduction pillow was discussed. · Communication/Consultation:  None today  · Equipment provided today: 3/8/21: doorway pulley  · Recommendations/Intent for next treatment session: Next visit will focus on improving L shoulder pain and ROM.     Total Treatment Billable Duration: 50 minutes  PT Patient Time In/Time Out  Time In: 1400  Time Out: Hernandez Roberts PT, DPT    Future Appointments   Date Time Provider Margo Leo   3/16/2021 10:15 AM Amada Daniela [facility]   3/18/2021 10:15 AM Amada Daniela [facility]   3/23/2021 10:15 AM Amada Daniela [facility]   3/25/2021 10:15 AM Amada Daniela [facility]   3/30/2021 10:15 AM Amada Daniela [facility]   4/1/2021  9:30 AM Amada Daniela [facility]   4/2/2021  8:00 AM Jaiden Lal MD [facility]   4/6/2021 10:15 AM Amada Daniela [facility]   4/8/2021 10:15 AM Amada Daniela [facility]   4/13/2021 11:00 AM Amada Daniela [facility]   4/15/2021 11:00 AM Amada Daniela [facility]   4/20/2021 10:15 AM Amada Daniela [facility]   4/22/2021  9:00 AM ELISA Anthony [facility]   4/22/2021 10:15 AM Amada Suarez [facility]   4/27/2021 10:15 AM Amada Suarez [facility] 4/29/2021 10:15 AM Kenn Lew [facility]

## 2021-03-16 ENCOUNTER — HOSPITAL ENCOUNTER (OUTPATIENT)
Dept: PHYSICAL THERAPY | Age: 56
Discharge: HOME | End: 2021-03-16
Payer: COMMERCIAL

## 2021-03-16 PROCEDURE — 97016 VASOPNEUMATIC DEVICE THERAPY: CPT

## 2021-03-16 PROCEDURE — 97110 THERAPEUTIC EXERCISES: CPT

## 2021-03-16 NOTE — PROGRESS NOTES
Sher Rivera  : 1965  Primary: Inocencio Schumacher Of [facility]*  Secondary:  [address] @ [address]  [facility] [address]  [phone]   [phone]      OUTPATIENT PHYSICAL THERAPY: Daily Treatment Note 3/16/2021  Date of surgery: 21  Visit Count:  3    ICD-10: Treatment Diagnosis: Pain in left shoulder (M25.512) and Stiffness of left shoulder, not elsewhere classified (M25.612)  TREATMENT PLAN:  Effective Dates: 3/8/2021 TO 2021 (90 days). Frequency/Duration: 2 times a week for 90 Day(s)  GOALS: (Goals have been discussed and agreed upon with patient.)  Short-Term Functional Goals: Time Frame: 4 weeks  # Goal Status   1 Pt will confirm compliance with home program to facilitate improvement in function. NEW     Discharge goals: Time frame: 12 weeks   # Goal Status   1 Pt will be able to elevate UE to at least 130° without symptoms in order to participate in ADLs such as reaching overhead and pulling shirt on. NEW   2 Pt will be able to reach over head to touch upper back without symptoms to be able to participate in ADLs such as reaching overhead and washing head. NEW   3 Pt will be able to reach across body to touch spine of opposite scapula without symptoms to be able to participate in ADLs such as reaching tasks. NEW   4 Pt will be able to reach behind back to touch lumbar region without symptoms to be able to participate in dressing and toileting activities. NEW          Pre-treatment Symptoms/Complaints:  Pt arrived today with no abduction pillow. He reports he has been gradually weaning away from using pillow. Pain: Initial:   Pt reports no pain Post Session:  No pain   Medications Last Reviewed: 3/16/2021  Updated Objective Findings: Below measures from initial evaluation unless otherwise noted. 3/8/21  Pain at worst: 10/10  Observation: Incisions healing well. Pt wearing sling with abduction pillow.   ROM:    Left Right   Elevation (°) Passive flexion to ~60 ° per visual estimation 133   Behind neck reach Not tested To upper back   Behind back reach Not tested To upper lumbar region   Cross body reach Not tested To opposite scapula    R IR and ER WNL and pain-free. L IR to stomach, L ER to neutral.     UE ANDT: median-bias (+) L  Strength: 5/5 throughout on R. L not tested. DASH: 40/55. TREATMENT:   THERAPEUTIC ACTIVITY: (see chart for minutes): Therapeutic activities per grid below to improve mobility, strength, balance and coordination. Required minimal visual, verbal and tactile cues to improve independence with functional mobility and activities. THERAPEUTIC EXERCISE: (see chart for minutes):  Exercises per grid below to improve mobility, strength, balance and coordination. Required minimal visual, verbal and tactile cues to promote proper body alignment and promote proper body mechanics. Progressed resistance, range, repetitions and complexity of movement as indicated. NEUROMUSCULAR RE-EDUCATION: (see chart for minutes):  Exercise/activities per grid below to improve balance, coordination, kinesthetic sense, posture, pain, and proprioception. Required minimal visual, verbal and tactile cues to promote motor control of left, upper extremity(s). MANUAL THERAPY: (see chart for minutes): Joint mobilization, Soft tissue mobilization, skin mobilization, and muscle energy techniques were utilized and necessary because of the patient's restricted joint motion, restricted motion of soft tissue and pain . MODALITIES: (see chart for minutes):      *  Cold Pack Therapy in order to provide analgesia and reduce inflammation and edema.      Date: 3/8/21 (visit 1) 3/12/21 (visit 2)  3/16/21 (visit 3)      Modalities:  15 min 15 min     Game ready to L shoulder, medium compression, coldest setting   15' 15'     Therapeutic Exercise: 23 min 35 min 35 min      Pendulum: x10 CW/CCW  Elbow flexion/extension: x10 L  Shoulder circles: x10 forwards and backwards   Pulley flexion: x10   Shoulder isometric: x1 hold all directions   PROM: x5' into ER and flexion   Pt educated on home program implementation and given printout. Pendulum: x10 CW/CCW  Elbow flexion/extension: 2x10 L  Shoulder circles: 2x10  Pulley flexion: 2'   Shoulder isometric: 2x1' hold all directions   PROM: x10' into ER and flexion  Pendulum: x10 CW/CCW  Elbow flexion/extension: x10 L  Shoulder circles: x10  Pulley flexion: x20  Shoulder isometric: 2x1' hold all directions   PROM: x10' into ER and flexion   Supine stick press AAROM: 2x10  Supine stick ER AAROM: x10      Neuromuscular re-education                Manual Therapy:                Therapeutic Activities:                  Home program: 3/8/21: pendulum, pulley, elbow flexion/extension, shoulder circles, shoulder isometrics    MedBridge Portal  Treatment/Session Summary:    · Response to Treatment: Pt advanced to AAROM exercises with stick and he tolerated this well. He demonstrates improved PROM into flexion today. · Communication/Consultation:  None today  · Equipment provided today: 3/8/21: doorway pulley  · Recommendations/Intent for next treatment session: Next visit will focus on improving L shoulder pain and ROM.     Total Treatment Billable Duration: 50 minutes  PT Patient Time In/Time Out  Time In: 0794  Time Out: 71 Manuela Van PT, DPT    Future Appointments   Date Time Provider Margo Leo   3/18/2021 10:15 AM Ana Fluke [facility]   3/23/2021 10:15 AM Ana Fluke [facility]   3/25/2021 10:15 AM Ana Fluke [facility]   3/30/2021 10:15 AM Ana Fluke [facility]   4/1/2021  9:30 AM Ana Fluke [facility]   4/2/2021  8:00 AM Jorje Lal MD [facility]   4/6/2021 10:15 AM Ana Fluke [facility]   4/8/2021 10:15 AM Ana Fluke [facility]   4/13/2021 11:00 AM Ana Fluke [facility]   4/15/2021 11:00 AM Ana Miranda [facility]   4/20/2021 10:15 AM Lukasz Christianson, [place] [facility]   4/22/2021  9:00 AM ELISA Salazar   4/22/2021 10:15 AM Kenn Dryer [facility]   4/27/2021 10:15 AM Kenn Dryer [facility]   4/29/2021 10:15 AM Kenn Dryer [facility]

## 2021-03-18 ENCOUNTER — HOSPITAL ENCOUNTER (OUTPATIENT)
Dept: PHYSICAL THERAPY | Age: 56
Discharge: HOME | End: 2021-03-18
Payer: COMMERCIAL

## 2021-03-18 PROCEDURE — 97016 VASOPNEUMATIC DEVICE THERAPY: CPT

## 2021-03-18 PROCEDURE — 97110 THERAPEUTIC EXERCISES: CPT

## 2021-03-18 NOTE — PROGRESS NOTES
Ivon Syed  : 1965  Primary: Héctor Bernal Of [facility]a [facility]anks*  Secondary:  [address] @ [address], [address] [place] [address]  [phone]   [phone]      OUTPATIENT PHYSICAL THERAPY: Daily Treatment Note 3/18/2021  Date of surgery: 21  Visit Count:  4    ICD-10: Treatment Diagnosis: Pain in left shoulder (M25.512) and Stiffness of left shoulder, not elsewhere classified (M25.612)  TREATMENT PLAN:  Effective Dates: 3/8/2021 TO 2021 (90 days). Frequency/Duration: 2 times a week for 90 Day(s)  GOALS: (Goals have been discussed and agreed upon with patient.)  Short-Term Functional Goals: Time Frame: 4 weeks  # Goal Status   1 Pt will confirm compliance with home program to facilitate improvement in function. NEW     Discharge goals: Time frame: 12 weeks   # Goal Status   1 Pt will be able to elevate UE to at least 130° without symptoms in order to participate in ADLs such as reaching overhead and pulling shirt on. NEW   2 Pt will be able to reach over head to touch upper back without symptoms to be able to participate in ADLs such as reaching overhead and washing head. NEW   3 Pt will be able to reach across body to touch spine of opposite scapula without symptoms to be able to participate in ADLs such as reaching tasks. NEW   4 Pt will be able to reach behind back to touch lumbar region without symptoms to be able to participate in dressing and toileting activities. NEW          Pre-treatment Symptoms/Complaints:  Pt reports he hasn't used abduction pillow since last session. He reports his arm feels a little sore which he thinks is from new exercises which he has worked on at home. Pain: Initial:   Pt reports no pain Post Session:  No pain   Medications Last Reviewed: 3/18/2021  Updated Objective Findings: Below measures from initial evaluation unless otherwise noted. 3/8/21  Pain at worst: 10/10  Observation: Incisions healing well.  Pt wearing sling with abduction pillow. ROM:    Left Right   Elevation (°) Passive flexion to ~60 ° per visual estimation 133   Behind neck reach Not tested To upper back   Behind back reach Not tested To upper lumbar region   Cross body reach Not tested To opposite scapula    R IR and ER WNL and pain-free. L IR to stomach, L ER to neutral.     UE ANDT: median-bias (+) L  Strength: 5/5 throughout on R. L not tested. DASH: 40/55. TREATMENT:   THERAPEUTIC ACTIVITY: (see chart for minutes): Therapeutic activities per grid below to improve mobility, strength, balance and coordination. Required minimal visual, verbal and tactile cues to improve independence with functional mobility and activities. THERAPEUTIC EXERCISE: (see chart for minutes):  Exercises per grid below to improve mobility, strength, balance and coordination. Required minimal visual, verbal and tactile cues to promote proper body alignment and promote proper body mechanics. Progressed resistance, range, repetitions and complexity of movement as indicated. NEUROMUSCULAR RE-EDUCATION: (see chart for minutes):  Exercise/activities per grid below to improve balance, coordination, kinesthetic sense, posture, pain, and proprioception. Required minimal visual, verbal and tactile cues to promote motor control of left, upper extremity(s). MANUAL THERAPY: (see chart for minutes): Joint mobilization, Soft tissue mobilization, skin mobilization, and muscle energy techniques were utilized and necessary because of the patient's restricted joint motion, restricted motion of soft tissue and pain . MODALITIES: (see chart for minutes):      *  Cold Pack Therapy in order to provide analgesia and reduce inflammation and edema.      Date: 3/8/21 (visit 1) 3/12/21 (visit 2)  3/16/21 (visit 3)  3/18/21 (visit 4)    Modalities:  15 min 15 min 15 min    Game ready to L shoulder, medium compression, coldest setting   15' 15' 15'    Therapeutic Exercise: 23 min 35 min 35 min 30 min     Pendulum: x10 CW/CCW  Elbow flexion/extension: x10 L  Shoulder circles: x10 forwards and backwards   Pulley flexion: x10   Shoulder isometric: x1 hold all directions   PROM: x5' into ER and flexion   Pt educated on home program implementation and given printout. Pendulum: x10 CW/CCW  Elbow flexion/extension: 2x10 L  Shoulder circles: 2x10  Pulley flexion: 2'   Shoulder isometric: 2x1' hold all directions   PROM: x10' into ER and flexion  Pendulum: x10 CW/CCW  Elbow flexion/extension: x10 L  Shoulder circles: x10  Pulley flexion: x20  Shoulder isometric: 2x1' hold all directions   PROM: x10' into ER and flexion   Supine stick press AAROM: 2x10  Supine stick ER AAROM: x10  Pendulum: x10 CW/CCW  Elbow flexion/extension: x10 L  Shoulder circles: x10  Pulley flexion: x2'  UBE AAROM: 3'/3' L3  Shoulder isometric: x1' hold all directions   PROM: x5' into ER and flexion   Supine stick press AAROM: 2x10  Supine stick ER AAROM: x10     Neuromuscular re-education                Manual Therapy:                Therapeutic Activities:                  Home program: 3/8/21: pendulum, pulley, elbow flexion/extension, shoulder circles, shoulder isometrics    MedBridge Portal  Treatment/Session Summary:    · Response to Treatment: Pt advanced to UBE AAROM today and he reported after he loosened up it felt good. · Communication/Consultation:  None today  · Equipment provided today: 3/8/21: doorway pulley  · Recommendations/Intent for next treatment session: Next visit will focus on improving L shoulder pain and ROM.     Total Treatment Billable Duration: 45 minutes  PT Patient Time In/Time Out  Time In: 1015  Time Out: [address] Loc Cerna PT, DPT    Future Appointments   Date Time Provider Margo Leo   3/23/2021 10:15 AM Rhae Clifford [facility]   3/25/2021 10:15 AM Rhae Clifford [facility]   3/30/2021 10:15 AM Rhae Clifford [facility]   4/1/2021  9:30 AM Rhae Clifford [facility]   4/2/2021 8:00 AM Floridalma Lal MD [facility]   4/6/2021 10:15 AM [place] Latin [facility]   4/8/2021 10:15 AM [place] Latin [facility]   4/13/2021 11:00 AM [place] Latin [facility]   4/15/2021 11:00 AM [place] Latin [facility]   4/20/2021 10:15 AM [place] Latin [facility]   4/22/2021  9:00 AM ELISA Schmidt [facility]   4/22/2021 10:15 AM [place] Latin [facility]   4/27/2021 10:15 AM Shilpa Latin [facility]   4/29/2021 10:15 AM Shilpa Latin [facility]

## 2021-03-23 ENCOUNTER — HOSPITAL ENCOUNTER (OUTPATIENT)
Dept: PHYSICAL THERAPY | Age: 56
Discharge: HOME | End: 2021-03-23
Payer: COMMERCIAL

## 2021-03-23 PROCEDURE — 97110 THERAPEUTIC EXERCISES: CPT

## 2021-03-23 PROCEDURE — 97016 VASOPNEUMATIC DEVICE THERAPY: CPT

## 2021-03-23 NOTE — PROGRESS NOTES
Kerri Sawyer  : 1965  Primary: Arlouisa Hardy Of Shawna Hanks*  Secondary:  [address] @ [address], Andrey Bolanos.  [phone]   [phone]      OUTPATIENT PHYSICAL THERAPY: Daily Treatment Note 3/23/2021  Date of surgery: 21  Visit Count:  5    ICD-10: Treatment Diagnosis: Pain in left shoulder (M25.512) and Stiffness of left shoulder, not elsewhere classified (M25.612)  TREATMENT PLAN:  Effective Dates: 3/8/2021 TO 2021 (90 days). Frequency/Duration: 2 times a week for 90 Day(s)  GOALS: (Goals have been discussed and agreed upon with patient.)  Short-Term Functional Goals: Time Frame: 4 weeks  # Goal Status   1 Pt will confirm compliance with home program to facilitate improvement in function. NEW     Discharge goals: Time frame: 12 weeks   # Goal Status   1 Pt will be able to elevate UE to at least 130° without symptoms in order to participate in ADLs such as reaching overhead and pulling shirt on. NEW   2 Pt will be able to reach over head to touch upper back without symptoms to be able to participate in ADLs such as reaching overhead and washing head. NEW   3 Pt will be able to reach across body to touch spine of opposite scapula without symptoms to be able to participate in ADLs such as reaching tasks. NEW   4 Pt will be able to reach behind back to touch lumbar region without symptoms to be able to participate in dressing and toileting activities. NEW          Pre-treatment Symptoms/Complaints:  Pt reports his shoulder will just ache rather than hurt. He reports he tried sleeping without sling but it was uncomfortable so he put sling back on. Pain: Initial:   Pt reports no pain at rest  Post Session:  No pain   Medications Last Reviewed: 3/23/2021  Updated Objective Findings: Below measures from initial evaluation unless otherwise noted. 3/8/21  Pain at worst: 10/10  Observation: Incisions healing well.  Pt wearing sling with abduction pillow.  ROM:    Left Right   Elevation (°) Passive flexion to ~60 ° per visual estimation 133   Behind neck reach Not tested To upper back   Behind back reach Not tested To upper lumbar region   Cross body reach Not tested To opposite scapula    R IR and ER WNL and pain-free.   L IR to stomach, L ER to neutral.     UE ANDT: median-bias (+) L  Strength: 5/5 throughout on R. L not tested.   DASH: 40/55.     TREATMENT:   THERAPEUTIC ACTIVITY: (see chart for minutes):  Therapeutic activities per grid below to improve mobility, strength, balance and coordination.  Required minimal visual, verbal and tactile cues to improve independence with functional mobility and activities.  THERAPEUTIC EXERCISE: (see chart for minutes):  Exercises per grid below to improve mobility, strength, balance and coordination.  Required minimal visual, verbal and tactile cues to promote proper body alignment and promote proper body mechanics.  Progressed resistance, range, repetitions and complexity of movement as indicated.  NEUROMUSCULAR RE-EDUCATION: (see chart for minutes):  Exercise/activities per grid below to improve balance, coordination, kinesthetic sense, posture, pain, and proprioception.  Required minimal visual, verbal and tactile cues to promote motor control of left, upper extremity(s).  MANUAL THERAPY: (see chart for minutes): Joint mobilization, Soft tissue mobilization, skin mobilization, and muscle energy techniques were utilized and necessary because of the patient's restricted joint motion, restricted motion of soft tissue and pain .   MODALITIES: (see chart for minutes):      *  Cold Pack Therapy in order to provide analgesia and reduce inflammation and edema.     Date: 3/8/21 (visit 1) 3/12/21 (visit 2)  3/16/21 (visit 3)  3/18/21 (visit 4) 3/23/21 (visit 5)    Modalities:  15 min 15 min 15 min 15 min'   Game ready to L shoulder, medium compression, coldest setting   15' 15' 15'    Therapeutic Exercise:  23 min 35 min 35 min 30 min 32 min    Pendulum: x10 CW/CCW  Elbow flexion/extension: x10 L  Shoulder circles: x10 forwards and backwards   Pulley flexion: x10   Shoulder isometric: x1 hold all directions   PROM: x5' into ER and flexion   Pt educated on home program implementation and given printout. Pendulum: x10 CW/CCW  Elbow flexion/extension: 2x10 L  Shoulder circles: 2x10  Pulley flexion: 2'   Shoulder isometric: 2x1' hold all directions   PROM: x10' into ER and flexion  Pendulum: x10 CW/CCW  Elbow flexion/extension: x10 L  Shoulder circles: x10  Pulley flexion: x20  Shoulder isometric: 2x1' hold all directions   PROM: x10' into ER and flexion   Supine stick press AAROM: 2x10  Supine stick ER AAROM: x10  Pendulum: x10 CW/CCW  Elbow flexion/extension: x10 L  Shoulder circles: x10  Pulley flexion: x2'  UBE AAROM: 3'/3' L3  Shoulder isometric: x1' hold all directions   PROM: x5' into ER and flexion   Supine stick press AAROM: 2x10  Supine stick ER AAROM: x10  Shoulder circles: x10  Pulley flexion: x2'  UBE AAROM: 3'/3' L3  PROM: x3' into ER and flexion   Supine stick press: x10 AAROM, x10 AROM  Stick flexion: 2x10 AAROM  Supine stick ER AAROM: x10   Table flexion: x10  Wall slide: x10 AAROM  Stick flexion in seated: 2x10 AAROM L   Neuromuscular re-education                Manual Therapy:                Therapeutic Activities:                  Home program: 3/8/21: pendulum, pulley, elbow flexion/extension, shoulder circles, shoulder isometrics    [facility] Portal  Treatment/Session Summary:    · Response to Treatment: Pt tolerated new exercises well. He was educated to begin weaning from sling tomorrow and he confirmed understanding. · Communication/Consultation:  None today  · Equipment provided today: 3/8/21: doorway pulley  · Recommendations/Intent for next treatment session: Next visit will focus on improving L shoulder pain and ROM.     Total Treatment Billable Duration: 47 minutes  PT Patient Time In/Time Out  Time In: 3549  Time Out: [address] Stan Villa, DPT    Future Appointments   Date Time Provider Margo Leo   3/25/2021 10:15 AM Terrace Midget [facility]   3/30/2021 10:15 AM Terrace Midget [facility]   4/1/2021  9:30 AM Terrace Midget [facility]   4/2/2021  8:00 AM Isrrael Lal MD [facility]PA   4/6/2021 10:15 AM Terrace Midget [facility]   4/8/2021 10:15 AM Terrace Midget [facility]   4/13/2021 11:00 AM Terrace Midget [facility]   4/15/2021 11:00 AM Terrace Midget [facility]   4/20/2021 10:15 AM Terrace Midget [facility]   4/21/2021 10:15 AM Terrace Midget [facility]   4/22/2021  9:00 AM ELISA ChungA

## 2021-03-25 ENCOUNTER — HOSPITAL ENCOUNTER (OUTPATIENT)
Dept: PHYSICAL THERAPY | Age: 56
Discharge: HOME | End: 2021-03-25
Payer: COMMERCIAL

## 2021-03-25 PROCEDURE — 97110 THERAPEUTIC EXERCISES: CPT

## 2021-03-25 PROCEDURE — 97016 VASOPNEUMATIC DEVICE THERAPY: CPT

## 2021-03-25 NOTE — PROGRESS NOTES
Charan Hunt  : 1965  Primary: Doris Stephen Of Shawna Hanks*  Secondary:  Francisca Ruiz @ [address], [place] TERRI Bolanos.  [phone]   [phone]      OUTPATIENT PHYSICAL THERAPY: Daily Treatment and progress Note 3/25/2021  Date of surgery: 21  Visit Count:  6    ICD-10: Treatment Diagnosis: Pain in left shoulder (M25.512) and Stiffness of left shoulder, not elsewhere classified (M25.612)  TREATMENT PLAN:  Effective Dates: 3/8/2021 TO 2021 (90 days). Frequency/Duration: 2 times a week for 90 Day(s)  GOALS: (Goals have been discussed and agreed upon with patient.)  Short-Term Functional Goals: Time Frame: 4 weeks  # Goal Status   1 Pt will confirm compliance with home program to facilitate improvement in function. MET     Discharge goals: Time frame: 12 weeks   # Goal Status   1 Pt will be able to elevate UE to at least 130° without symptoms in order to participate in ADLs such as reaching overhead and pulling shirt on.  3/25/21: L UE elevation to 110 °  PROGRESSING   2 Pt will be able to reach over head to touch upper back without symptoms to be able to participate in ADLs such as reaching overhead and washing head. ONGOING   3 Pt will be able to reach across body to touch spine of opposite scapula without symptoms to be able to participate in ADLs such as reaching tasks. ONGOING   4 Pt will be able to reach behind back to touch lumbar region without symptoms to be able to participate in dressing and toileting activities. ONGOING        Pre-treatment Symptoms/Complaints:  Pt arrived today with no sling. He reports he has slowly been weaning from it starting yesterday. He reports it has been going well though he notices tension in forearm after being out of a sling a while.    Pain: Initial:   Pt reports no pain at rest  Post Session:  No pain   Medications Last Reviewed: 3/25/2021  Updated Objective Findings: Below measures from initial evaluation unless otherwise noted. 3/8/21  Pain at worst: 10/10  Observation: Incisions healing well. Pt wearing sling with abduction pillow. ROM:    Left Right   Elevation (°) Passive flexion to ~60 ° per visual estimation 133   Behind neck reach Not tested To upper back   Behind back reach Not tested To upper lumbar region   Cross body reach Not tested To opposite scapula    R IR and ER WNL and pain-free. L IR to stomach, L ER to neutral.     UE ANDT: median-bias (+) L  Strength: 5/5 throughout on R. L not tested. DASH: 40/55.     3/25/21:  L UE elevation to 110 °   TREATMENT:   THERAPEUTIC ACTIVITY: (see chart for minutes): Therapeutic activities per grid below to improve mobility, strength, balance and coordination. Required minimal visual, verbal and tactile cues to improve independence with functional mobility and activities. THERAPEUTIC EXERCISE: (see chart for minutes):  Exercises per grid below to improve mobility, strength, balance and coordination. Required minimal visual, verbal and tactile cues to promote proper body alignment and promote proper body mechanics. Progressed resistance, range, repetitions and complexity of movement as indicated. NEUROMUSCULAR RE-EDUCATION: (see chart for minutes):  Exercise/activities per grid below to improve balance, coordination, kinesthetic sense, posture, pain, and proprioception. Required minimal visual, verbal and tactile cues to promote motor control of left, upper extremity(s). MANUAL THERAPY: (see chart for minutes): Joint mobilization, Soft tissue mobilization, skin mobilization, and muscle energy techniques were utilized and necessary because of the patient's restricted joint motion, restricted motion of soft tissue and pain . MODALITIES: (see chart for minutes):      *  Cold Pack Therapy in order to provide analgesia and reduce inflammation and edema.      Date: 3/25/21 (visit 6) PN   Modalities: 15 min   Game ready to L shoulder, medium compression, coldest setting  15'   Therapeutic Exercise: 30 min    Pendulum: x10   Elbow flexion/extension: 2x10  Median nerve mobilization: x10  Pulley: 2'   Wall slide: 2x10 L  Supine press: x10 with stick, x10 actively  Stick flexion supine: 2x10   Stick ER: 2x10 L  Stick flexion in seated: 2x10 L  Active flexion to 90 °: 2x10 with no pain, just fatigue  S/L ER: 2x10 L with fatigue   Neuromuscular re-education        Manual Therapy:        Therapeutic Activities:          Home program: 3/8/21: pendulum, pulley, elbow flexion/extension, shoulder circles, shoulder isometrics    [facility] Portal  Treatment/Session Summary:    · Response to Treatment: Pt advanced to active flexion to 90 ° and active S/L ER, both which were not painful, just fatiguing. He demonstrates significant improvement in L shoulder ROM. He continues to have impaired L shoulder ROM, strength, and activity tolerance and will continue to benefit from skilled physical therapy. · Communication/Consultation:  None today  · Equipment provided today: 3/8/21: doorway pulley  · Recommendations/Intent for next treatment session: Next visit will focus on improving L shoulder pain and ROM.     Total Treatment Billable Duration: 45 minutes  PT Patient Time In/Time Out  Time In: 1015  Time Out: [address], PT, DPT    Future Appointments   Date Time Provider Margo Leo   3/30/2021 10:15 AM Ana Fluke [facility]   4/1/2021  9:30 [facility]   4/2/2021  8:00 AM Jorje Lal MD [facility]PA FPA   4/6/2021 10:15 AM [facility]   4/8/2021 10:15 AM [facility]   4/13/2021 11:00 AM [facility]   4/15/2021 11:00 AM [facility]   4/20/2021 10:15 AM [facility]   4/21/2021 10:15 AM [facility]   4/22/2021  9:00 AM ELISA ReneeA

## 2021-03-30 ENCOUNTER — HOSPITAL ENCOUNTER (OUTPATIENT)
Dept: PHYSICAL THERAPY | Age: 56
Discharge: HOME | End: 2021-03-30
Payer: COMMERCIAL

## 2021-03-30 PROCEDURE — 97016 VASOPNEUMATIC DEVICE THERAPY: CPT

## 2021-03-30 PROCEDURE — 97110 THERAPEUTIC EXERCISES: CPT

## 2021-03-30 NOTE — PROGRESS NOTES
Hugo Sanchez  : 1965  Primary: Farheen Shakeeljasson Of [facility] hermilo Hanks*  Secondary:  Bhavna Valle @ [address]  [address] [place] [address], [address] [place] [address]  [phone]   [phone]      OUTPATIENT PHYSICAL THERAPY: Daily Treatment Note 3/30/2021  Date of surgery: 21  Visit Count:  7    ICD-10: Treatment Diagnosis: Pain in left shoulder (M25.512) and Stiffness of left shoulder, not elsewhere classified (M25.612)  TREATMENT PLAN:  Effective Dates: 3/8/2021 TO 2021 (90 days). Frequency/Duration: 2 times a week for 90 Day(s)  GOALS: (Goals have been discussed and agreed upon with patient.)  Short-Term Functional Goals: Time Frame: 4 weeks  # Goal Status   1 Pt will confirm compliance with home program to facilitate improvement in function. MET     Discharge goals: Time frame: 12 weeks   # Goal Status   1 Pt will be able to elevate UE to at least 130° without symptoms in order to participate in ADLs such as reaching overhead and pulling shirt on.  3/25/21: L UE elevation to 110 °  PROGRESSING   2 Pt will be able to reach over head to touch upper back without symptoms to be able to participate in ADLs such as reaching overhead and washing head. ONGOING   3 Pt will be able to reach across body to touch spine of opposite scapula without symptoms to be able to participate in ADLs such as reaching tasks. ONGOING   4 Pt will be able to reach behind back to touch lumbar region without symptoms to be able to participate in dressing and toileting activities. ONGOING        Pre-treatment Symptoms/Complaints:  Pt reports he was sore after session and had some tightness. He hasn't been using sling much anymore and slept a little bit last night with it off. Pain: Initial:   0/10 Post Session:  No pain   Medications Last Reviewed: 3/30/2021  Updated Objective Findings: Below measures from initial evaluation unless otherwise noted.   3/8/21  Pain at worst: 10/10  Observation: Incisions healing well. Pt wearing sling with abduction pillow. ROM:    Left Right   Elevation (°) Passive flexion to ~60 ° per visual estimation 133   Behind neck reach Not tested To upper back   Behind back reach Not tested To upper lumbar region   Cross body reach Not tested To opposite scapula    R IR and ER WNL and pain-free. L IR to stomach, L ER to neutral.     UE ANDT: median-bias (+) L  Strength: 5/5 throughout on R. L not tested. DASH: 40/55.     3/25/21:  L UE elevation to 110 °   TREATMENT:   THERAPEUTIC ACTIVITY: (see chart for minutes): Therapeutic activities per grid below to improve mobility, strength, balance and coordination. Required minimal visual, verbal and tactile cues to improve independence with functional mobility and activities. THERAPEUTIC EXERCISE: (see chart for minutes):  Exercises per grid below to improve mobility, strength, balance and coordination. Required minimal visual, verbal and tactile cues to promote proper body alignment and promote proper body mechanics. Progressed resistance, range, repetitions and complexity of movement as indicated. NEUROMUSCULAR RE-EDUCATION: (see chart for minutes):  Exercise/activities per grid below to improve balance, coordination, kinesthetic sense, posture, pain, and proprioception. Required minimal visual, verbal and tactile cues to promote motor control of left, upper extremity(s). MANUAL THERAPY: (see chart for minutes): Joint mobilization, Soft tissue mobilization, skin mobilization, and muscle energy techniques were utilized and necessary because of the patient's restricted joint motion, restricted motion of soft tissue and pain . MODALITIES: (see chart for minutes):      *  Cold Pack Therapy in order to provide analgesia and reduce inflammation and edema.      Date: 3/25/21 (visit 6) PN 3/30/21 (visit 7)       Modalities: 15 min 15 min      Game ready to L shoulder, medium compression, coldest setting  15' 15'      Therapeutic Exercise: 30 min 38 min       Pendulum: x10   Elbow flexion/extension: 2x10  Median nerve mobilization: x10  Pulley: 2'   Wall slide: 2x10 L  Supine press: x10 with stick, x10 actively  Stick flexion supine: 2x10   Stick ER: 2x10 L  Stick flexion in seated: 2x10 L  Active flexion to 90 °: 2x10 with no pain, just fatigue  S/L ER: 2x10 L with fatigue UBE: 3'/3' L4   Wall slide: 2x10 L  Supine press: 2x10 actively  Flexion supine: 2x10   Stick flexion in seated: 2x10 L  Active flexion to 90 °: 2x10   Active abduction to 90 °: 2x10  S/L ER: 2x10 L  S/L abduction: 2x10 L  PROM: x8' into IR and ER      Neuromuscular re-education                Manual Therapy:                Therapeutic Activities:                  Home program: 3/8/21: pendulum, pulley, elbow flexion/extension, shoulder circles, shoulder isometrics    [facility] Portal  Treatment/Session Summary:    · Response to Treatment: Pt continues to progress well and was advanced to active abduction to 90 ° in seated and S/L which he tolerated well. · Communication/Consultation:  None today  · Equipment provided today: 3/8/21: doorway pulley  · Recommendations/Intent for next treatment session: Next visit will focus on improving L shoulder pain and ROM.     Total Treatment Billable Duration: 53 minutes  PT Patient Time In/Time Out  Time In: 1010  Time Out: [address] PT, DPT    Future Appointments   Date Time Provider Margo Leo   4/1/2021  9:30 AM Dianna Canedward [facility]   4/2/2021  8:00 AM Viki Lal MD [facility] FPA   4/6/2021 10:15 AM Dianna Cane [facility]   4/8/2021 10:15 AM Dianna Cane [facility]   4/13/2021 11:00 AM Dianna Cane [facility]   4/15/2021 11:00 AM Dianna Cane [facility]   4/20/2021 10:15 AM Dianna Cane [facility]   4/21/2021 10:15 AM Dianna Cane [facility]   4/22/2021  9:00 AM ELISA De Leon POAG POA

## 2021-04-01 ENCOUNTER — HOSPITAL ENCOUNTER (OUTPATIENT)
Dept: PHYSICAL THERAPY | Age: 56
Discharge: HOME | End: 2021-04-01
Payer: COMMERCIAL

## 2021-04-01 PROCEDURE — 97110 THERAPEUTIC EXERCISES: CPT

## 2021-04-01 PROCEDURE — 97016 VASOPNEUMATIC DEVICE THERAPY: CPT

## 2021-04-01 NOTE — PROGRESS NOTES
Cintia Mae  : 1965  Primary: [facility] Hanks*  Secondary:  Luis Carlos Hart @ [address], [facility].  [phone]   [phone]      OUTPATIENT PHYSICAL THERAPY: Daily Treatment Note 2021  Date of surgery: 21  Visit Count:  8    ICD-10: Treatment Diagnosis: Pain in left shoulder (M25.512) and Stiffness of left shoulder, not elsewhere classified (M25.612)  TREATMENT PLAN:  Effective Dates: 3/8/2021 TO 2021 (90 days). Frequency/Duration: 2 times a week for 90 Day(s)  GOALS: (Goals have been discussed and agreed upon with patient.)  Short-Term Functional Goals: Time Frame: 4 weeks  # Goal Status   1 Pt will confirm compliance with home program to facilitate improvement in function. MET     Discharge goals: Time frame: 12 weeks   # Goal Status   1 Pt will be able to elevate UE to at least 130° without symptoms in order to participate in ADLs such as reaching overhead and pulling shirt on.  3/25/21: L UE elevation to 110 °  PROGRESSING   2 Pt will be able to reach over head to touch upper back without symptoms to be able to participate in ADLs such as reaching overhead and washing head. ONGOING   3 Pt will be able to reach across body to touch spine of opposite scapula without symptoms to be able to participate in ADLs such as reaching tasks. ONGOING   4 Pt will be able to reach behind back to touch lumbar region without symptoms to be able to participate in dressing and toileting activities. ONGOING        Pre-treatment Symptoms/Complaints:  Pt reports he was able to sleep last night without sling. He reports he has continued his exercises and they feel good. Pain: Initial:   0/10 Post Session:  No pain   Medications Last Reviewed: 2021  Updated Objective Findings: Below measures from initial evaluation unless otherwise noted. 3/8/21  Pain at worst: 10/10  Observation: Incisions healing well.  Pt wearing sling with abduction pillow. ROM:    Left Right   Elevation (°) Passive flexion to ~60 ° per visual estimation 133   Behind neck reach Not tested To upper back   Behind back reach Not tested To upper lumbar region   Cross body reach Not tested To opposite scapula    R IR and ER WNL and pain-free. L IR to stomach, L ER to neutral.     UE ANDT: median-bias (+) L  Strength: 5/5 throughout on R. L not tested. DASH: 40/55.     3/25/21:  L UE elevation to 110 °   TREATMENT:   THERAPEUTIC ACTIVITY: (see chart for minutes): Therapeutic activities per grid below to improve mobility, strength, balance and coordination. Required minimal visual, verbal and tactile cues to improve independence with functional mobility and activities. THERAPEUTIC EXERCISE: (see chart for minutes):  Exercises per grid below to improve mobility, strength, balance and coordination. Required minimal visual, verbal and tactile cues to promote proper body alignment and promote proper body mechanics. Progressed resistance, range, repetitions and complexity of movement as indicated. NEUROMUSCULAR RE-EDUCATION: (see chart for minutes):  Exercise/activities per grid below to improve balance, coordination, kinesthetic sense, posture, pain, and proprioception. Required minimal visual, verbal and tactile cues to promote motor control of left, upper extremity(s). MANUAL THERAPY: (see chart for minutes): Joint mobilization, Soft tissue mobilization, skin mobilization, and muscle energy techniques were utilized and necessary because of the patient's restricted joint motion, restricted motion of soft tissue and pain . MODALITIES: (see chart for minutes):      *  Cold Pack Therapy in order to provide analgesia and reduce inflammation and edema.      Date: 3/25/21 (visit 6) PN 3/30/21 (visit 7)  4/1/21 (visit 8)      Modalities: 15 min 15 min 15 min     Game ready to L shoulder, medium compression, coldest setting  15' 15' 15'      Therapeutic Exercise: 30 min 38 min 38 min      Pendulum: x10   Elbow flexion/extension: 2x10  Median nerve mobilization: x10  Pulley: 2'   Wall slide: 2x10 L  Supine press: x10 with stick, x10 actively  Stick flexion supine: 2x10   Stick ER: 2x10 L  Stick flexion in seated: 2x10 L  Active flexion to 90 °: 2x10 with no pain, just fatigue  S/L ER: 2x10 L with fatigue UBE: 3'/3' L4   Wall slide: 2x10 L  Supine press: 2x10 actively  Flexion supine: 2x10   Stick flexion in seated: 2x10 L  Active flexion to 90 °: 2x10   Active abduction to 90 °: 2x10  S/L ER: 2x10 L  S/L abduction: 2x10 L  PROM: x8' into IR and ER UBE: 3'/3' L4   Wall slide: 2x10 L  Supine press: 2x10 actively  Supine fly: 2x10   Stick flexion in seated: 2x10 L  Active flexion to 90 °: 2x10   Active abduction to 90 °: 2x10  S/L ER: 2x10 L  S/L abduction: 2x10 L  Bent over fly: 2x10  Bent over T and Y: 2x10 each  PROM: x3' into IR and ER     Neuromuscular re-education                Manual Therapy:                Therapeutic Activities:                  Home program: 3/8/21: pendulum, pulley, elbow flexion/extension, shoulder circles, shoulder isometrics    MedBridge Portal  Treatment/Session Summary:    · Response to Treatment: Pt advanced to bent over AROM exercises and tolerated them well. He was educated to work on wall slide at doorway to increase flexion stretch. · Communication/Consultation:  None today  · Equipment provided today: 3/8/21: doorway pulley  · Recommendations/Intent for next treatment session: Next visit will focus on improving L shoulder pain and ROM.     Total Treatment Billable Duration: 53 minutes  PT Patient Time In/Time Out  Time In: 0067  Time Out: Nicole Hinson PT, DPT    Future Appointments   Date Time Provider Margo Leo   4/2/2021  8:00 AM Shona Lal MD [facility] FPA   4/6/2021 10:15 AM Kelley MURRAY [facility]   4/8/2021 10:15 AM Kelley MURRAY [facility]   4/13/2021 11:00 AM Kelley MURRAY [facility]   4/15/2021 11:00 AM Rosalia Cleveland [facility]   4/20/2021 10:15 AM Rosalia MURRAY [facility]   4/21/2021 10:15 AM Rosalia MURRAY [facility]   4/22/2021  9:00 AM ELISA Stuart

## 2021-04-06 ENCOUNTER — HOSPITAL ENCOUNTER (OUTPATIENT)
Dept: PHYSICAL THERAPY | Age: 56
Discharge: HOME | End: 2021-04-06
Payer: COMMERCIAL

## 2021-04-06 PROCEDURE — 97110 THERAPEUTIC EXERCISES: CPT

## 2021-04-06 PROCEDURE — 97016 VASOPNEUMATIC DEVICE THERAPY: CPT

## 2021-04-06 NOTE — PROGRESS NOTES
Gurjit Andersen  : 1965  Primary: [facility] hermilo Hanks*  Secondary:  [address]alloway @ [address], [place].  [phone]   [phone]      OUTPATIENT PHYSICAL THERAPY: Daily Treatment Note 2021  Date of surgery: 21  Visit Count:  9    ICD-10: Treatment Diagnosis: Pain in left shoulder (M25.512) and Stiffness of left shoulder, not elsewhere classified (M25.612)  TREATMENT PLAN:  Effective Dates: 3/8/2021 TO 2021 (90 days). Frequency/Duration: 2 times a week for 90 Day(s)  GOALS: (Goals have been discussed and agreed upon with patient.)  Short-Term Functional Goals: Time Frame: 4 weeks  # Goal Status   1 Pt will confirm compliance with home program to facilitate improvement in function. MET     Discharge goals: Time frame: 12 weeks   # Goal Status   1 Pt will be able to elevate UE to at least 130° without symptoms in order to participate in ADLs such as reaching overhead and pulling shirt on.  3/25/21: L UE elevation to 110 °  PROGRESSING   2 Pt will be able to reach over head to touch upper back without symptoms to be able to participate in ADLs such as reaching overhead and washing head. ONGOING   3 Pt will be able to reach across body to touch spine of opposite scapula without symptoms to be able to participate in ADLs such as reaching tasks. ONGOING   4 Pt will be able to reach behind back to touch lumbar region without symptoms to be able to participate in dressing and toileting activities. ONGOING        Pre-treatment Symptoms/Complaints:  Pt reports his shoulder felt a little tight over the weekend but not painful. Pain: Initial:   0/10 Post Session:  No pain   Medications Last Reviewed: 2021  Updated Objective Findings: Below measures from initial evaluation unless otherwise noted. 3/8/21  Pain at worst: 10/10  Observation: Incisions healing well. Pt wearing sling with abduction pillow.   ROM:    Left Right Elevation (°) Passive flexion to ~60 ° per visual estimation 133   Behind neck reach Not tested To upper back   Behind back reach Not tested To upper lumbar region   Cross body reach Not tested To opposite scapula    R IR and ER WNL and pain-free. L IR to stomach, L ER to neutral.     UE ANDT: median-bias (+) L  Strength: 5/5 throughout on R. L not tested. DASH: 40/55.     3/25/21:  L UE elevation to 110 °   TREATMENT:   THERAPEUTIC ACTIVITY: (see chart for minutes): Therapeutic activities per grid below to improve mobility, strength, balance and coordination. Required minimal visual, verbal and tactile cues to improve independence with functional mobility and activities. THERAPEUTIC EXERCISE: (see chart for minutes):  Exercises per grid below to improve mobility, strength, balance and coordination. Required minimal visual, verbal and tactile cues to promote proper body alignment and promote proper body mechanics. Progressed resistance, range, repetitions and complexity of movement as indicated. NEUROMUSCULAR RE-EDUCATION: (see chart for minutes):  Exercise/activities per grid below to improve balance, coordination, kinesthetic sense, posture, pain, and proprioception. Required minimal visual, verbal and tactile cues to promote motor control of left, upper extremity(s). MANUAL THERAPY: (see chart for minutes): Joint mobilization, Soft tissue mobilization, skin mobilization, and muscle energy techniques were utilized and necessary because of the patient's restricted joint motion, restricted motion of soft tissue and pain . MODALITIES: (see chart for minutes):      *  Cold Pack Therapy in order to provide analgesia and reduce inflammation and edema.      Date: 3/25/21 (visit 6) PN 3/30/21 (visit 7)  4/1/21 (visit 8)  4/6/21 (visit 9)    Modalities: 15 min 15 min 15 min 15 min    Game ready to L shoulder, medium compression, coldest setting  15' 15' 15'  15'    Therapeutic Exercise: 30 min 38 min 38 min 40 min     Pendulum: x10   Elbow flexion/extension: 2x10  Median nerve mobilization: x10  Pulley: 2'   Wall slide: 2x10 L  Supine press: x10 with stick, x10 actively  Stick flexion supine: 2x10   Stick ER: 2x10 L  Stick flexion in seated: 2x10 L  Active flexion to 90 °: 2x10 with no pain, just fatigue  S/L ER: 2x10 L with fatigue UBE: 3'/3' L4   Wall slide: 2x10 L  Supine press: 2x10 actively  Flexion supine: 2x10   Stick flexion in seated: 2x10 L  Active flexion to 90 °: 2x10   Active abduction to 90 °: 2x10  S/L ER: 2x10 L  S/L abduction: 2x10 L  PROM: x8' into IR and ER UBE: 3'/3' L4   Wall slide: 2x10 L  Supine press: 2x10 actively  Supine fly: 2x10   Stick flexion in seated: 2x10 L  Active flexion to 90 °: 2x10   Active abduction to 90 °: 2x10  S/L ER: 2x10 L  S/L abduction: 2x10 L  Bent over fly: 2x10  Bent over T and Y: 2x10 each  PROM: x3' into IR and ER UBE: 3'/3' L4   Wall slide: 2x10 L  Supine press: x10, 2x10x1#   Supine fly: 2x10   Active flexion to 90 °: 2x10   Active abduction to 90 °: 2x10  S/L ER: 2x10x1# L  S/L abduction: 2x10 L  Bent over row: 2x10x1#  Bent over T and Y: 2x10 each    Neuromuscular re-education                Manual Therapy:                Therapeutic Activities:                  Home program: 3/8/21: pendulum, pulley, elbow flexion/extension, shoulder circles, shoulder isometrics    MedBridge Portal  Treatment/Session Summary:    · Response to Treatment: Pt advanced to light weight with press, row, and ER and reported no pain with them. · Communication/Consultation:  None today  · Equipment provided today: 3/8/21: doorway pulley  · Recommendations/Intent for next treatment session: Next visit will focus on improving L shoulder pain and ROM.     Total Treatment Billable Duration: 55 minutes  PT Patient Time In/Time Out  Time In: 2234  Time Out: [address] [facility], PT, DPT    Future Appointments   Date Time Provider Margo Leo   4/8/2021 10:15 AM Rosalia Cleveland [facility] 4/13/2021 11:00 AM Dread Ronnyrolf [facility]   4/15/2021 11:00 AM Dread Ronnyrolf [facility]   4/20/2021 10:15 AM Dread Senna [facility]   4/21/2021 10:15 AM Dread Senrolf [facility]   4/22/2021  9:00 AM ELISA Schultz [facility]A   6/30/2021  8:00 AM Dean Lal MD [facility]   10/5/2021  9:00 AM Dean Lal MD [facility]

## 2021-04-08 ENCOUNTER — HOSPITAL ENCOUNTER (OUTPATIENT)
Dept: PHYSICAL THERAPY | Age: 56
Discharge: HOME | End: 2021-04-08
Payer: COMMERCIAL

## 2021-04-08 PROCEDURE — 97016 VASOPNEUMATIC DEVICE THERAPY: CPT

## 2021-04-08 PROCEDURE — 97110 THERAPEUTIC EXERCISES: CPT

## 2021-04-08 NOTE — PROGRESS NOTES
Nadege Hunter  : 1965  Primary: [employer] [facility] [facility]*  Secondary:  [address] @ [address], [facility] TERRI [place].  [phone]   [phone]      OUTPATIENT PHYSICAL THERAPY: Daily Treatment Note 2021  Date of surgery: 21  Visit Count:  10    ICD-10: Treatment Diagnosis: Pain in left shoulder (M25.512) and Stiffness of left shoulder, not elsewhere classified (M25.612)  TREATMENT PLAN:  Effective Dates: 3/8/2021 TO 2021 (90 days). Frequency/Duration: 2 times a week for 90 Day(s)  GOALS: (Goals have been discussed and agreed upon with patient.)  Short-Term Functional Goals: Time Frame: 4 weeks  # Goal Status   1 Pt will confirm compliance with home program to facilitate improvement in function. MET     Discharge goals: Time frame: 12 weeks   # Goal Status   1 Pt will be able to elevate UE to at least 130° without symptoms in order to participate in ADLs such as reaching overhead and pulling shirt on.  3/25/21: L UE elevation to 110 °  PROGRESSING   2 Pt will be able to reach over head to touch upper back without symptoms to be able to participate in ADLs such as reaching overhead and washing head. ONGOING   3 Pt will be able to reach across body to touch spine of opposite scapula without symptoms to be able to participate in ADLs such as reaching tasks. ONGOING   4 Pt will be able to reach behind back to touch lumbar region without symptoms to be able to participate in dressing and toileting activities. ONGOING        Pre-treatment Symptoms/Complaints:  Pt reports his shoulder was a little bit sore from workout but otherwise felt fine. Pain: Initial:   0/10 Post Session:  No pain   Medications Last Reviewed: 2021  Updated Objective Findings: Below measures from initial evaluation unless otherwise noted. 3/8/21  Pain at worst: 10/10  Observation: Incisions healing well. Pt wearing sling with abduction pillow.   ROM:    Left Right   Elevation (°) Passive flexion to ~60 ° per visual estimation 133   Behind neck reach Not tested To upper back   Behind back reach Not tested To upper lumbar region   Cross body reach Not tested To opposite scapula    R IR and ER WNL and pain-free. L IR to stomach, L ER to neutral.     UE ANDT: median-bias (+) L  Strength: 5/5 throughout on R. L not tested. DASH: 40/55.     3/25/21:  L UE elevation to 110 °   TREATMENT:   THERAPEUTIC ACTIVITY: (see chart for minutes): Therapeutic activities per grid below to improve mobility, strength, balance and coordination. Required minimal visual, verbal and tactile cues to improve independence with functional mobility and activities. THERAPEUTIC EXERCISE: (see chart for minutes):  Exercises per grid below to improve mobility, strength, balance and coordination. Required minimal visual, verbal and tactile cues to promote proper body alignment and promote proper body mechanics. Progressed resistance, range, repetitions and complexity of movement as indicated. NEUROMUSCULAR RE-EDUCATION: (see chart for minutes):  Exercise/activities per grid below to improve balance, coordination, kinesthetic sense, posture, pain, and proprioception. Required minimal visual, verbal and tactile cues to promote motor control of left, upper extremity(s). MANUAL THERAPY: (see chart for minutes): Joint mobilization, Soft tissue mobilization, skin mobilization, and muscle energy techniques were utilized and necessary because of the patient's restricted joint motion, restricted motion of soft tissue and pain . MODALITIES: (see chart for minutes):      *  Cold Pack Therapy in order to provide analgesia and reduce inflammation and edema.      Date: 3/25/21 (visit 6) PN 3/30/21 (visit 7)  4/1/21 (visit 8)  4/6/21 (visit 9) 4/8/21 (visit 10)    Modalities: 15 min 15 min 15 min 15 min 15 min   Game ready to L shoulder, medium compression, coldest setting  15' 15' 15'  15' 15' Therapeutic Exercise: 30 min 38 min 38 min 40 min 40 min    Pendulum: x10   Elbow flexion/extension: 2x10  Median nerve mobilization: x10  Pulley: 2'   Wall slide: 2x10 L  Supine press: x10 with stick, x10 actively  Stick flexion supine: 2x10   Stick ER: 2x10 L  Stick flexion in seated: 2x10 L  Active flexion to 90 °: 2x10 with no pain, just fatigue  S/L ER: 2x10 L with fatigue UBE: 3'/3' L4   Wall slide: 2x10 L  Supine press: 2x10 actively  Flexion supine: 2x10   Stick flexion in seated: 2x10 L  Active flexion to 90 °: 2x10   Active abduction to 90 °: 2x10  S/L ER: 2x10 L  S/L abduction: 2x10 L  PROM: x8' into IR and ER UBE: 3'/3' L4   Wall slide: 2x10 L  Supine press: 2x10 actively  Supine fly: 2x10   Stick flexion in seated: 2x10 L  Active flexion to 90 °: 2x10   Active abduction to 90 °: 2x10  S/L ER: 2x10 L  S/L abduction: 2x10 L  Bent over fly: 2x10  Bent over T and Y: 2x10 each  PROM: x3' into IR and ER UBE: 3'/3' L4   Wall slide: 2x10 L  Supine press: x10, 2x10x1#   Supine fly: 2x10   Active flexion to 90 °: 2x10   Active abduction to 90 °: 2x10  S/L ER: 2x10x1# L  S/L abduction: 2x10 L  Bent over row: 2x10x1#  Bent over T and Y: 2x10 each UBE: 3'/3' L4   Wall slide: 2x10 L  Wall stretch: 2x5 holds   Supine press: 2x10x3# B  Supine fly: 2x10   Active flexion to 90 °: x10, 2x10x1#  Active abduction to 90 °: x10, 10x1# B  S/L ER: 2x10x1# L  S/L abduction: 2x10x1# L  Curl: 2x10x3#   Bent over row: 2x10x3#  Bent over T: x10, 2x10x1#   Neuromuscular re-education                Manual Therapy:                Therapeutic Activities:                  Home program: 3/8/21: pendulum, pulley, elbow flexion/extension, shoulder circles, shoulder isometrics    MedBridge Portal  Treatment/Session Summary:    · Response to Treatment: Pt progressed to resistance with more exercises and tolerated this well.    · Communication/Consultation:  None today  · Equipment provided today: 3/8/21: benedict pulley  · Recommendations/Intent for next treatment session: Next visit will focus on improving L shoulder pain and ROM.     Total Treatment Billable Duration: 55 minutes  PT Patient Time In/Time Out  Time In: 5989  Time Out: 4[address] [facility], PT, DPT    Future Appointments   Date Time Provider Margo Leo   4/13/2021 11:00 AM Marilee Aw [facility]   4/15/2021 11:00 AM Wenden Aw [facility]   4/20/2021 10:15 AM Marilee Aw [facility]   4/21/2021 10:15 AM Marilee Aw [facility]   4/22/2021  9:00 AM ELISA Cherry POA   6/30/2021  8:00 AM Dangler, Cherylene Boers, MD [facility]   10/5/2021  9:00 AM Dangler, Cherylene Boers, MD [facility]

## 2021-04-13 ENCOUNTER — HOSPITAL ENCOUNTER (OUTPATIENT)
Dept: PHYSICAL THERAPY | Age: 56
Discharge: HOME | End: 2021-04-13
Payer: COMMERCIAL

## 2021-04-13 PROCEDURE — 97016 VASOPNEUMATIC DEVICE THERAPY: CPT

## 2021-04-13 PROCEDURE — 97110 THERAPEUTIC EXERCISES: CPT

## 2021-04-13 NOTE — PROGRESS NOTES
Ary Martin  : 1965  Primary: [facility]*  Secondary:  [address]  [phone]   [phone]      OUTPATIENT PHYSICAL THERAPY: Daily Treatment Note 2021  Date of surgery: 21  Visit Count:  11    ICD-10: Treatment Diagnosis: Pain in left shoulder (M25.512) and Stiffness of left shoulder, not elsewhere classified (M25.612)  TREATMENT PLAN:  Effective Dates: 3/8/2021 TO 2021 (90 days). Frequency/Duration: 2 times a week for 90 Day(s)  GOALS: (Goals have been discussed and agreed upon with patient.)  Short-Term Functional Goals: Time Frame: 4 weeks  # Goal Status   1 Pt will confirm compliance with home program to facilitate improvement in function. MET     Discharge goals: Time frame: 12 weeks   # Goal Status   1 Pt will be able to elevate UE to at least 130° without symptoms in order to participate in ADLs such as reaching overhead and pulling shirt on.  3/25/21: L UE elevation to 110 °  PROGRESSING   2 Pt will be able to reach over head to touch upper back without symptoms to be able to participate in ADLs such as reaching overhead and washing head. ONGOING   3 Pt will be able to reach across body to touch spine of opposite scapula without symptoms to be able to participate in ADLs such as reaching tasks. ONGOING   4 Pt will be able to reach behind back to touch lumbar region without symptoms to be able to participate in dressing and toileting activities. ONGOING        Pre-treatment Symptoms/Complaints:  Pt reports his shoulder was tight after last session due to new exercises but he feels fine today. Pain: Initial:   0/10 Post Session:  No pain   Medications Last Reviewed: 2021  Updated Objective Findings: Below measures from initial evaluation unless otherwise noted. 3/8/21  Pain at worst: 10/10  Observation: Incisions healing well.  Pt wearing sling with abduction pillow. ROM:    Left Right   Elevation (°) Passive flexion to ~60 ° per visual estimation 133   Behind neck reach Not tested To upper back   Behind back reach Not tested To upper lumbar region   Cross body reach Not tested To opposite scapula    R IR and ER WNL and pain-free. L IR to stomach, L ER to neutral.     UE ANDT: median-bias (+) L  Strength: 5/5 throughout on R. L not tested. DASH: 40/55.     3/25/21:  L UE elevation to 110 °   TREATMENT:   THERAPEUTIC ACTIVITY: (see chart for minutes): Therapeutic activities per grid below to improve mobility, strength, balance and coordination. Required minimal visual, verbal and tactile cues to improve independence with functional mobility and activities. THERAPEUTIC EXERCISE: (see chart for minutes):  Exercises per grid below to improve mobility, strength, balance and coordination. Required minimal visual, verbal and tactile cues to promote proper body alignment and promote proper body mechanics. Progressed resistance, range, repetitions and complexity of movement as indicated. NEUROMUSCULAR RE-EDUCATION: (see chart for minutes):  Exercise/activities per grid below to improve balance, coordination, kinesthetic sense, posture, pain, and proprioception. Required minimal visual, verbal and tactile cues to promote motor control of left, upper extremity(s). MANUAL THERAPY: (see chart for minutes): Joint mobilization, Soft tissue mobilization, skin mobilization, and muscle energy techniques were utilized and necessary because of the patient's restricted joint motion, restricted motion of soft tissue and pain . MODALITIES: (see chart for minutes):      *  Cold Pack Therapy in order to provide analgesia and reduce inflammation and edema.      Date: 4/8/21 (visit 10)  4/13/21 (visit 11)       Modalities: 15 min 15 min      Game ready to L shoulder, medium compression, coldest setting  15' 15'      Therapeutic Exercise: 40 min 38 min       UBE: 3'/3' L4   Wall slide: 2x10 L  Wall stretch: 2x5 holds   Supine press: 2x10x3# B  Supine fly: 2x10   Active flexion to 90 °: x10, 2x10x1#  Active abduction to 90 °: x10, 10x1# B  S/L ER: 2x10x1# L  S/L abduction: 2x10x1# L  Curl: 2x10x3#   Bent over row: 2x10x3#  Bent over T: x10, 2x10x1# Bike for UE ROM: x5'  Wall slide: x10 L flexion and abduction  Wall stretch: x5 holds   Cross body stretch: x3 holds  Behind back stretch: x3 holds   Behind head stretch: x3 holds   Supine press: 2x10x5# B  Supine fly: 2x10   Active flexion to 90 °: 2x10x2#  Active abduction to 90 °: 2x10x2#   S/L ER: 2x10x2# L  S/L abduction: 2x10 L  Bent over row: 2x10x5#  Bent over T: 2x10x2#      Neuromuscular re-education                Manual Therapy:                Therapeutic Activities:                  Home program: 3/8/21: pendulum, pulley, elbow flexion/extension, shoulder circles, shoulder isometrics    [facility] Portal  Treatment/Session Summary:    · Response to Treatment: Pt advanced to greater weights and advanced stretches which he found challenging but tolerated them well. · Communication/Consultation:  None today  · Equipment provided today: 3/8/21: doorway pulley  · Recommendations/Intent for next treatment session: Next visit will focus on improving L shoulder pain and ROM.     Total Treatment Billable Duration: 53 minutes  PT Patient Time In/Time Out  Time In: 1100  Time Out: Darshan Funk PT, DPT    Future Appointments   Date Time Provider Margo Leo   4/15/2021 11:00 AM Aman Manzanares [facility]   2021 10:15 AM Aman Manzanares [facility]   2021 10:15 AM [facility]   2021  9:00 AM ELISA Cardozo A   2021  8:00 AM North Lal MD [facility]   10/5/2021  9:00 AM North Lal MD [facility] FPA

## 2021-04-15 ENCOUNTER — HOSPITAL ENCOUNTER (OUTPATIENT)
Dept: PHYSICAL THERAPY | Age: 56
Discharge: HOME | End: 2021-04-15
Payer: COMMERCIAL

## 2021-04-15 PROCEDURE — 97016 VASOPNEUMATIC DEVICE THERAPY: CPT

## 2021-04-15 PROCEDURE — 97110 THERAPEUTIC EXERCISES: CPT

## 2021-04-15 NOTE — PROGRESS NOTES
Sarah Pulse  : 1965  Primary: Jaz Hawking [facility]*  Secondary:  [address] [facility] @ [address] [place] [address]  [phone]   [phone]      OUTPATIENT PHYSICAL THERAPY: Daily Treatment and progress Note 4/15/2021  Date of surgery: 21  Visit Count:  12    ICD-10: Treatment Diagnosis: Pain in left shoulder (M25.512) and Stiffness of left shoulder, not elsewhere classified (M25.612)  TREATMENT PLAN:  Effective Dates: 3/8/2021 TO 2021 (90 days). Frequency/Duration: 2 times a week for 90 Day(s)  GOALS: (Goals have been discussed and agreed upon with patient.)  Short-Term Functional Goals: Time Frame: 4 weeks  # Goal Status   1 Pt will confirm compliance with home program to facilitate improvement in function. MET     Discharge goals: Time frame: 12 weeks   # Goal Status   1 Pt will be able to elevate UE to at least 130° without symptoms in order to participate in ADLs such as reaching overhead and pulling shirt on.  3/25/21: L UE elevation to 110 °   4/15/21: L elevation to 130 °  MET   2 Pt will be able to reach over head to touch upper back without symptoms to be able to participate in ADLs such as reaching overhead and washing head. MET   3 Pt will be able to reach across body to touch spine of opposite scapula without symptoms to be able to participate in ADLs such as reaching tasks. 4/15/21: pt able to reach opposite scapula with assistance from R hand PROGRESSING   4 Pt will be able to reach behind back to touch lumbar region without symptoms to be able to participate in dressing and toileting activities. 4/15/21: pt able to reach lumbar region with assistance form R  PROGRESSING        Pre-treatment Symptoms/Complaints:  Pt reports his shoulder was tight after last session due to new exercises but he feels fine today.    Pain: Initial:   0/10 Post Session:  No pain   Medications Last Reviewed: 4/15/2021  Updated Objective Findings: Below measures from initial evaluation unless otherwise noted. 3/8/21  Pain at worst: 10/10  Observation: Incisions healing well. Pt wearing sling with abduction pillow. ROM:    Left Right   Elevation (°) Passive flexion to ~60 ° per visual estimation 133   Behind neck reach Not tested To upper back   Behind back reach Not tested To upper lumbar region   Cross body reach Not tested To opposite scapula    R IR and ER WNL and pain-free. L IR to stomach, L ER to neutral.     UE ANDT: median-bias (+) L  Strength: 5/5 throughout on R. L not tested. DASH: 40/55.     3/25/21:  L UE elevation to 110 °   TREATMENT:   THERAPEUTIC ACTIVITY: (see chart for minutes): Therapeutic activities per grid below to improve mobility, strength, balance and coordination. Required minimal visual, verbal and tactile cues to improve independence with functional mobility and activities. THERAPEUTIC EXERCISE: (see chart for minutes):  Exercises per grid below to improve mobility, strength, balance and coordination. Required minimal visual, verbal and tactile cues to promote proper body alignment and promote proper body mechanics. Progressed resistance, range, repetitions and complexity of movement as indicated. NEUROMUSCULAR RE-EDUCATION: (see chart for minutes):  Exercise/activities per grid below to improve balance, coordination, kinesthetic sense, posture, pain, and proprioception. Required minimal visual, verbal and tactile cues to promote motor control of left, upper extremity(s). MANUAL THERAPY: (see chart for minutes): Joint mobilization, Soft tissue mobilization, skin mobilization, and muscle energy techniques were utilized and necessary because of the patient's restricted joint motion, restricted motion of soft tissue and pain . MODALITIES: (see chart for minutes):      *  Cold Pack Therapy in order to provide analgesia and reduce inflammation and edema.      Date: 4/8/21 (visit 10)  4/13/21 (visit 11)  4/15/21 (visit 12) PN     Modalities: 15 min 15 min 15 min     Game ready to L shoulder, medium compression, coldest setting  15' 15' 15'     Therapeutic Exercise: 40 min 38 min 40 min      UBE: 3'/3' L4   Wall slide: 2x10 L  Wall stretch: 2x5 holds   Supine press: 2x10x3# B  Supine fly: 2x10   Active flexion to 90 °: x10, 2x10x1#  Active abduction to 90 °: x10, 10x1# B  S/L ER: 2x10x1# L  S/L abduction: 2x10x1# L  Curl: 2x10x3#   Bent over row: 2x10x3#  Bent over T: x10, 2x10x1# Bike for UE ROM: x5'  Wall slide: x10 L flexion and abduction  Wall stretch: x5 holds   Cross body stretch: x3 holds  Behind back stretch: x3 holds   Behind head stretch: x3 holds   Supine press: 2x10x5# B  Supine fly: 2x10   Active flexion to 90 °: 2x10x2#  Active abduction to 90 °: 2x10x2#   S/L ER: 2x10x2# L  S/L abduction: 2x10 L  Bent over row: 2x10x5#  Bent over T: 2x10x2# Bike for UE ROM: x5'  Wall slide: x10 L flexion and abduction  Wall stretch: x5 holds   Cross body stretch: x3 holds  Behind back stretch: x3 holds   Supine press: 10x3#, 2x10x9# bar  Supine fly: x10   Active flexion to 90 °: 2x10x3#  Active abduction to 90 °: 2x10x3#   Bar curl: 2x10x9#  BOR: 2x10x9#   S/L ER: 2x10x3# L  S/L abduction: 2x10x3# L     Neuromuscular re-education                Manual Therapy:                Therapeutic Activities:                  Home program: 3/8/21: pendulum, pulley, elbow flexion/extension, shoulder circles, shoulder isometrics    [facility] Portal  Treatment/Session Summary:    · Response to Treatment: Pt advanced to heavier weight today and found this challenging but tolerated it well. He has made significant progress with L shoulder strength and ROM. He continues to be tight with L reaching especially across body and behind back. He will continue to benefit from skilled physical therapy to improve L shoulder ROM and strength.    · Communication/Consultation:  None today  · Equipment provided today: 3/8/21: doorway pulley  · Recommendations/Intent for next treatment session: Next visit will focus on improving L shoulder pain and ROM.     Total Treatment Billable Duration: 55 minutes  PT Patient Time In/Time Out  Time In: 1100  Time Out: Darshan Buchanan Dears, PT, DPT    Future Appointments   Date Time Provider Margo Leo   4/20/2021 10:15 AM Bozena Caceres [facility]   4/21/2021 10:15 AM Bozena Caceres [facility]   4/22/2021  9:00 AM ELISA Marina [facility]   6/30/2021  8:00 AM Jacinda Lal MD [facility]   10/5/2021  9:00 AM Jacinda Lal MD [facility]

## 2021-04-20 ENCOUNTER — HOSPITAL ENCOUNTER (OUTPATIENT)
Dept: PHYSICAL THERAPY | Age: 56
Discharge: HOME | End: 2021-04-20
Payer: COMMERCIAL

## 2021-04-20 PROCEDURE — 97016 VASOPNEUMATIC DEVICE THERAPY: CPT

## 2021-04-20 PROCEDURE — 97110 THERAPEUTIC EXERCISES: CPT

## 2021-04-20 NOTE — PROGRESS NOTES
Megan Lung  : 1965  Primary: Katelynn Corolla Of [facility]a Hanks*  Secondary:  [address], [address] [place] [address]  [phone]   [phone]      OUTPATIENT PHYSICAL THERAPY: Daily Treatment Note 2021  Date of surgery: 21  Visit Count:  13    ICD-10: Treatment Diagnosis: Pain in left shoulder (M25.512) and Stiffness of left shoulder, not elsewhere classified (M25.612)  TREATMENT PLAN:  Effective Dates: 3/8/2021 TO 2021 (90 days). Frequency/Duration: 2 times a week for 90 Day(s)  GOALS: (Goals have been discussed and agreed upon with patient.)  Short-Term Functional Goals: Time Frame: 4 weeks  # Goal Status   1 Pt will confirm compliance with home program to facilitate improvement in function. MET     Discharge goals: Time frame: 12 weeks   # Goal Status   1 Pt will be able to elevate UE to at least 130° without symptoms in order to participate in ADLs such as reaching overhead and pulling shirt on.  3/25/21: L UE elevation to 110 °   4/15/21: L elevation to 130 °  MET   2 Pt will be able to reach over head to touch upper back without symptoms to be able to participate in ADLs such as reaching overhead and washing head. MET   3 Pt will be able to reach across body to touch spine of opposite scapula without symptoms to be able to participate in ADLs such as reaching tasks. 4/15/21: pt able to reach opposite scapula with assistance from R hand PROGRESSING   4 Pt will be able to reach behind back to touch lumbar region without symptoms to be able to participate in dressing and toileting activities. 4/15/21: pt able to reach lumbar region with assistance form R  PROGRESSING        Pre-treatment Symptoms/Complaints:  Pt reports he had a little soreness over the weekend from the new exercises but overall shoulder has been doing well.    Pain: Initial:   0/10 Post Session:  No pain   Medications Last Reviewed: 2021  Updated Objective Findings: Below measures from initial evaluation unless otherwise noted. 3/8/21  Pain at worst: 10/10  Observation: Incisions healing well. Pt wearing sling with abduction pillow. ROM:    Left Right   Elevation (°) Passive flexion to ~60 ° per visual estimation 133   Behind neck reach Not tested To upper back   Behind back reach Not tested To upper lumbar region   Cross body reach Not tested To opposite scapula    R IR and ER WNL and pain-free. L IR to stomach, L ER to neutral.     UE ANDT: median-bias (+) L  Strength: 5/5 throughout on R. L not tested. DASH: 40/55.     3/25/21:  L UE elevation to 110 °   TREATMENT:   THERAPEUTIC ACTIVITY: (see chart for minutes): Therapeutic activities per grid below to improve mobility, strength, balance and coordination. Required minimal visual, verbal and tactile cues to improve independence with functional mobility and activities. THERAPEUTIC EXERCISE: (see chart for minutes):  Exercises per grid below to improve mobility, strength, balance and coordination. Required minimal visual, verbal and tactile cues to promote proper body alignment and promote proper body mechanics. Progressed resistance, range, repetitions and complexity of movement as indicated. NEUROMUSCULAR RE-EDUCATION: (see chart for minutes):  Exercise/activities per grid below to improve balance, coordination, kinesthetic sense, posture, pain, and proprioception. Required minimal visual, verbal and tactile cues to promote motor control of left, upper extremity(s). MANUAL THERAPY: (see chart for minutes): Joint mobilization, Soft tissue mobilization, skin mobilization, and muscle energy techniques were utilized and necessary because of the patient's restricted joint motion, restricted motion of soft tissue and pain . MODALITIES: (see chart for minutes):      *  Cold Pack Therapy in order to provide analgesia and reduce inflammation and edema.      Date: 4/8/21 (visit 10)  4/13/21 (visit 11)  4/15/21 (visit 12) PN 4/20/21 (visit 13)     Modalities: 15 min 15 min 15 min 15 min    Game ready to L shoulder, medium compression, coldest setting  15' 15' 15' 15'    Therapeutic Exercise: 40 min 38 min 40 min 40 min     UBE: 3'/3' L4   Wall slide: 2x10 L  Wall stretch: 2x5 holds   Supine press: 2x10x3# B  Supine fly: 2x10   Active flexion to 90 °: x10, 2x10x1#  Active abduction to 90 °: x10, 10x1# B  S/L ER: 2x10x1# L  S/L abduction: 2x10x1# L  Curl: 2x10x3#   Bent over row: 2x10x3#  Bent over T: x10, 2x10x1# Bike for UE ROM: x5'  Wall slide: x10 L flexion and abduction  Wall stretch: x5 holds   Cross body stretch: x3 holds  Behind back stretch: x3 holds   Behind head stretch: x3 holds   Supine press: 2x10x5# B  Supine fly: 2x10   Active flexion to 90 °: 2x10x2#  Active abduction to 90 °: 2x10x2#   S/L ER: 2x10x2# L  S/L abduction: 2x10 L  Bent over row: 2x10x5#  Bent over T: 2x10x2# Bike for UE ROM: x5'  Wall slide: x10 L flexion and abduction  Wall stretch: x5 holds   Cross body stretch: x3 holds  Behind back stretch: x3 holds   Supine press: 10x3#, 2x10x9# bar  Supine fly: x10   Active flexion to 90 °: 2x10x3#  Active abduction to 90 °: 2x10x3#   Bar curl: 2x10x9#  BOR: 2x10x9#   S/L ER: 2x10x3# L  S/L abduction: 2x10x3# L UBE: 3'/3' L4  Wall slide: x10 L flexion and abduction  Wall stretch: x5 holds   Cross body stretch: x3 holds  Behind back stretch: x3 holds   Supine press: 2x10x5#  Supine fly: 2x10x5#  Active flexion to 90 °: 2x10x3#  Active abduction to 90 °: 2x10x3#   Bar curl: 2x10x3# B  BOR: 2x10x3# B  S/L ER: 2x10x3# L  S/L abduction: 2x10 L in full ROM  PROM: 3' into ER and flexion    Neuromuscular re-education                Manual Therapy:                Therapeutic Activities:                  Home program: 3/8/21: pendulum, pulley, elbow flexion/extension, shoulder circles, shoulder isometrics    [facility] Portal  Treatment/Session Summary:    · Response to Treatment: Pt continues to tolerate exercises well and makes progress with L shoulder strength. · Communication/Consultation:  None today  · Equipment provided today: 3/8/21: doorway pulley  · Recommendations/Intent for next treatment session: Next visit will focus on improving L shoulder pain and ROM.     Total Treatment Billable Duration: 55 minutes  PT Patient Time In/Time Out  Time In: 1015  Time Out: [address] [facility], PT, DPT    Future Appointments   Date Time Provider Margo Leo   4/21/2021 10:15 AM Ane Lapping [facility]   4/22/2021  9:00 AM ELISA Rice PO[facility]   5/4/2021 10:15 AM Ane Lapping [facility]   5/6/2021 10:15 AM Ane Lapping [facility]   5/11/2021 10:15 AM Ane Lapping [facility]   5/13/2021 10:15 AM Ane Lapping [facility]   5/18/2021 10:15 AM Ane Lapping [facility]   5/20/2021 10:15 AM Ane Lapping [facility]   5/25/2021 10:15 AM Ane Lapping [facility]   5/27/2021 10:15 AM Ane Lapping [facility]   6/30/2021  8:00 AM Nereida Lal MD [facility]   10/5/2021  9:00 AM Nereida Lal MD [facility]

## 2021-04-21 ENCOUNTER — HOSPITAL ENCOUNTER (OUTPATIENT)
Dept: PHYSICAL THERAPY | Age: 56
Discharge: HOME | End: 2021-04-21
Payer: COMMERCIAL

## 2021-04-21 PROCEDURE — 97016 VASOPNEUMATIC DEVICE THERAPY: CPT

## 2021-04-21 PROCEDURE — 97110 THERAPEUTIC EXERCISES: CPT

## 2021-04-21 NOTE — PROGRESS NOTES
[place]  : 1965  Primary: Doritian Motley Of Shawna Hanks*  Secondary:  [address] @ [place] [address].  [phone]   [phone]      OUTPATIENT PHYSICAL THERAPY: Daily Treatment Note 2021  Date of surgery: 21  Visit Count:  14    ICD-10: Treatment Diagnosis: Pain in left shoulder (M25.512) and Stiffness of left shoulder, not elsewhere classified (M25.612)  TREATMENT PLAN:  Effective Dates: 3/8/2021 TO 2021 (90 days). Frequency/Duration: 2 times a week for 90 Day(s)  GOALS: (Goals have been discussed and agreed upon with patient.)  Short-Term Functional Goals: Time Frame: 4 weeks  # Goal Status   1 Pt will confirm compliance with home program to facilitate improvement in function. MET     Discharge goals: Time frame: 12 weeks   # Goal Status   1 Pt will be able to elevate UE to at least 130° without symptoms in order to participate in ADLs such as reaching overhead and pulling shirt on.  3/25/21: L UE elevation to 110 °   4/15/21: L elevation to 130 °  MET   2 Pt will be able to reach over head to touch upper back without symptoms to be able to participate in ADLs such as reaching overhead and washing head. MET   3 Pt will be able to reach across body to touch spine of opposite scapula without symptoms to be able to participate in ADLs such as reaching tasks. 4/15/21: pt able to reach opposite scapula with assistance from R hand PROGRESSING   4 Pt will be able to reach behind back to touch lumbar region without symptoms to be able to participate in dressing and toileting activities. 4/15/21: pt able to reach lumbar region with assistance form R  PROGRESSING        Pre-treatment Symptoms/Complaints:  Pt reports he was a little sore last night from exercises but overall he feels like this week he has turned a corner. He reports his arm has been feeling better at night and he has been sleeping better.    Pain: Initial: 0/10 Post Session:  No pain   Medications Last Reviewed: 4/21/2021  Updated Objective Findings: Below measures from initial evaluation unless otherwise noted. 3/8/21  Pain at worst: 10/10  Observation: Incisions healing well. Pt wearing sling with abduction pillow. ROM:    Left Right   Elevation (°) Passive flexion to ~60 ° per visual estimation 133   Behind neck reach Not tested To upper back   Behind back reach Not tested To upper lumbar region   Cross body reach Not tested To opposite scapula    R IR and ER WNL and pain-free. L IR to stomach, L ER to neutral.     UE ANDT: median-bias (+) L  Strength: 5/5 throughout on R. L not tested. DASH: 40/55.     3/25/21:  L UE elevation to 110 °   TREATMENT:   THERAPEUTIC ACTIVITY: (see chart for minutes): Therapeutic activities per grid below to improve mobility, strength, balance and coordination. Required minimal visual, verbal and tactile cues to improve independence with functional mobility and activities. THERAPEUTIC EXERCISE: (see chart for minutes):  Exercises per grid below to improve mobility, strength, balance and coordination. Required minimal visual, verbal and tactile cues to promote proper body alignment and promote proper body mechanics. Progressed resistance, range, repetitions and complexity of movement as indicated. NEUROMUSCULAR RE-EDUCATION: (see chart for minutes):  Exercise/activities per grid below to improve balance, coordination, kinesthetic sense, posture, pain, and proprioception. Required minimal visual, verbal and tactile cues to promote motor control of left, upper extremity(s). MANUAL THERAPY: (see chart for minutes): Joint mobilization, Soft tissue mobilization, skin mobilization, and muscle energy techniques were utilized and necessary because of the patient's restricted joint motion, restricted motion of soft tissue and pain .    MODALITIES: (see chart for minutes):      *  Cold Pack Therapy in order to provide analgesia and reduce inflammation and edema.      Date: 4/8/21 (visit 10)  4/13/21 (visit 11)  4/15/21 (visit 12) PN 4/20/21 (visit 13)  4/21/21 (visit 14)    Modalities: 15 min 15 min 15 min 15 min 15 min   Game ready to L shoulder, medium compression, coldest setting  15' 15' 15' 15' 15'   Therapeutic Exercise: 40 min 38 min 40 min 40 min 38 min    UBE: 3'/3' L4   Wall slide: 2x10 L  Wall stretch: 2x5 holds   Supine press: 2x10x3# B  Supine fly: 2x10   Active flexion to 90 °: x10, 2x10x1#  Active abduction to 90 °: x10, 10x1# B  S/L ER: 2x10x1# L  S/L abduction: 2x10x1# L  Curl: 2x10x3#   Bent over row: 2x10x3#  Bent over T: x10, 2x10x1# Bike for UE ROM: x5'  Wall slide: x10 L flexion and abduction  Wall stretch: x5 holds   Cross body stretch: x3 holds  Behind back stretch: x3 holds   Behind head stretch: x3 holds   Supine press: 2x10x5# B  Supine fly: 2x10   Active flexion to 90 °: 2x10x2#  Active abduction to 90 °: 2x10x2#   S/L ER: 2x10x2# L  S/L abduction: 2x10 L  Bent over row: 2x10x5#  Bent over T: 2x10x2# Bike for UE ROM: x5'  Wall slide: x10 L flexion and abduction  Wall stretch: x5 holds   Cross body stretch: x3 holds  Behind back stretch: x3 holds   Supine press: 10x3#, 2x10x9# bar  Supine fly: x10   Active flexion to 90 °: 2x10x3#  Active abduction to 90 °: 2x10x3#   Bar curl: 2x10x9#  BOR: 2x10x9#   S/L ER: 2x10x3# L  S/L abduction: 2x10x3# L UBE: 3'/3' L4  Wall slide: x10 L flexion and abduction  Wall stretch: x5 holds   Cross body stretch: x3 holds  Behind back stretch: x3 holds   Supine press: 2x10x5#  Supine fly: 2x10x5#  Active flexion to 90 °: 2x10x3#  Active abduction to 90 °: 2x10x3#   Bar curl: 2x10x3# B  BOR: 2x10x3# B  S/L ER: 2x10x3# L  S/L abduction: 2x10 L in full ROM  PROM: 3' into ER and flexion UBE: 3'/3' L4  Table flexion stretch: x3 holds   Doorway stretch: x3  Cross body stretch: x3  Behind back stretch: x3 holds   Supine press: 10x4#, 1012# bar, 10x18# bar, 10x24# bar  Bent arm fly: 2x10x2# Landmine press with 10# on barbell: x10 both arms, 2x10 single arm  Landmine row: x10 with 10#, 2x6 with 35#  Cable triceps extension: 3a62m01#  Cable biceps curl: 1v94e97#  Band pull-apart: 2x10 red  Band ER: 2x10 red   Neuromuscular re-education                Manual Therapy:                Therapeutic Activities:                  Home program: 3/8/21: pendulum, pulley, elbow flexion/extension, shoulder circles, shoulder isometrics    [facility] Portal  Treatment/Session Summary:    · Response to Treatment: Pt advanced to new exercises and tolerated all these well. He will be gone next week due to visiting family in [place] and he confirmed understanding of continuing home program.   · Communication/Consultation:  None today  · Equipment provided today: 3/8/21: doorway pulley. 4/21/21: blue band   · Recommendations/Intent for next treatment session: Next visit will focus on improving L shoulder pain and ROM.     Total Treatment Billable Duration: 53 minutes  PT Patient Time In/Time Out  Time In: 1015  Time Out: [facility], PT, DPT    Future Appointments   Date Time Provider Margo Leo   4/22/2021  9:00 AM ELISA Simmons [facility]   5/4/2021 10:15 AM Ca Naas [facility]   5/6/2021 10:15 AM Ca Naas [facility]   5/11/2021 10:15 AM Ca Naas [facility]   5/13/2021 10:15 AM Ca Naas [facility]   5/18/2021 10:15 AM Ca Naas [facility]   5/20/2021 10:15 AM Ca Naas [facility]   5/25/2021 10:15 AM Ca Naas [facility]   5/27/2021 10:15 AM Ca Naas [facility]   6/30/2021  8:00 AM Sera Lal MD [facility]   10/5/2021  9:00 AM Sera Lal MD [facility]SA [facility]PA FPA

## 2021-04-22 ENCOUNTER — APPOINTMENT (OUTPATIENT)
Dept: PHYSICAL THERAPY | Age: 56
End: 2021-04-22
Payer: COMMERCIAL

## 2021-04-27 ENCOUNTER — APPOINTMENT (OUTPATIENT)
Dept: PHYSICAL THERAPY | Age: 56
End: 2021-04-27
Payer: COMMERCIAL

## 2021-04-29 ENCOUNTER — APPOINTMENT (OUTPATIENT)
Dept: PHYSICAL THERAPY | Age: 56
End: 2021-04-29
Payer: COMMERCIAL

## 2021-05-04 ENCOUNTER — HOSPITAL ENCOUNTER (OUTPATIENT)
Dept: PHYSICAL THERAPY | Age: 56
Discharge: HOME | End: 2021-05-04
Payer: COMMERCIAL

## 2021-05-04 PROCEDURE — 97016 VASOPNEUMATIC DEVICE THERAPY: CPT

## 2021-05-04 PROCEDURE — 97110 THERAPEUTIC EXERCISES: CPT

## 2021-05-04 NOTE — PROGRESS NOTES
[place]  : 1965  Primary: Brian Moon Of Shawna Hanks*  Secondary:  [address] @ [address], [place].  [phone]   [phone]      OUTPATIENT PHYSICAL THERAPY: Daily Treatment and progress Note 2021  Date of surgery: 21  Visit Count:  15    ICD-10: Treatment Diagnosis: Pain in left shoulder (M25.512) and Stiffness of left shoulder, not elsewhere classified (M25.612)  TREATMENT PLAN:  Effective Dates: 3/8/2021 TO 2021 (90 days). Frequency/Duration: 2 times a week for 90 Day(s)  GOALS: (Goals have been discussed and agreed upon with patient.)  Short-Term Functional Goals: Time Frame: 4 weeks  # Goal Status   1 Pt will confirm compliance with home program to facilitate improvement in function. MET     Discharge goals: Time frame: 12 weeks   # Goal Status   1 Pt will be able to elevate UE to at least 130° without symptoms in order to participate in ADLs such as reaching overhead and pulling shirt on.  3/25/21: L UE elevation to 110 °   4/15/21: L elevation to 130 °   21: L elevation to 132 °  MET   2 Pt will be able to reach over head to touch upper back without symptoms to be able to participate in ADLs such as reaching overhead and washing head. MET   3 Pt will be able to reach across body to touch spine of opposite scapula without symptoms to be able to participate in ADLs such as reaching tasks. 4/15/21: pt able to reach opposite scapula with assistance from R hand  21: pt able to reach spine of opposite scapula with no assistance but still tight PARTIALLY MET   4 Pt will be able to reach behind back to touch lumbar region without symptoms to be able to participate in dressing and toileting activities.   4/15/21: pt able to reach lumbar region with assistance form R   21: pt able to reach lumbar region with no assistance, but still very tight PARTIALLY MET        Pre-treatment Symptoms/Complaints:  Pt has been gone for 1 week due to visiting family. He reports he took his band and did his rotator cuff exercises frequently. He reports he is actually sore today from doing them so much. Pain: Initial:   0/10 Post Session:  No pain   Medications Last Reviewed: 5/4/2021  Updated Objective Findings: Below measures from initial evaluation unless otherwise noted. 3/8/21  Pain at worst: 10/10  Observation: Incisions healing well. Pt wearing sling with abduction pillow. ROM:    Left Right   Elevation (°) Passive flexion to ~60 ° per visual estimation 133   Behind neck reach Not tested To upper back   Behind back reach Not tested To upper lumbar region   Cross body reach Not tested To opposite scapula    R IR and ER WNL and pain-free. L IR to stomach, L ER to neutral.     UE ANDT: median-bias (+) L  Strength: 5/5 throughout on R. L not tested. DASH: 40/55.     3/25/21:  L UE elevation to 110 °     5/4/21:  L elevation to 132 °   Pt able to reach across body to opposite scapula and behind back to lumbar region but still very tight   TREATMENT:   THERAPEUTIC ACTIVITY: (see chart for minutes): Therapeutic activities per grid below to improve mobility, strength, balance and coordination. Required minimal visual, verbal and tactile cues to improve independence with functional mobility and activities. THERAPEUTIC EXERCISE: (see chart for minutes):  Exercises per grid below to improve mobility, strength, balance and coordination. Required minimal visual, verbal and tactile cues to promote proper body alignment and promote proper body mechanics. Progressed resistance, range, repetitions and complexity of movement as indicated. NEUROMUSCULAR RE-EDUCATION: (see chart for minutes):  Exercise/activities per grid below to improve balance, coordination, kinesthetic sense, posture, pain, and proprioception. Required minimal visual, verbal and tactile cues to promote motor control of left, upper extremity(s).   MANUAL THERAPY: (see chart for minutes): Joint mobilization, Soft tissue mobilization, skin mobilization, and muscle energy techniques were utilized and necessary because of the patient's restricted joint motion, restricted motion of soft tissue and pain . MODALITIES: (see chart for minutes):      *  Cold Pack Therapy in order to provide analgesia and reduce inflammation and edema. Date: 4/21/21 (visit 14)  5/4/21 (visit 15) PN      Modalities: 15 min 15 min      Game ready to L shoulder, medium compression, coldest setting  15' 15'       Therapeutic Exercise: 38 min 40 min       UBE: 3'/3' L4  Table flexion stretch: x3 holds   Doorway stretch: x3  Cross body stretch: x3  Behind back stretch: x3 holds   Supine press: 10x4#, 1012# bar, 10x18# bar, 10x24# bar  Bent arm fly: 2x10x2#   Landmine press with 10# on barbell: x10 both arms, 2x10 single arm  Landmine row: x10 with 10#, 2x6 with 35#  Cable triceps extension: 2o38y31#  Cable biceps curl: 2k02o93#  Band pull-apart: 2x10 red  Band ER: 2x10 red UBE: 3'/3' L4  Wall flexion stretch: x10 slides, x3 holds   Doorway stretch: x5  Cross body stretch: x3  DB flexion: 2x10x3#  DB abduction: 2x10x3#  Curls: 2x10x3#  BOR: 2x10x3#  DB reverse fly: 10x3#   Supine fly: 2x10x3#  Supine press: 10x18#, 10x24#, 10x34#   S/L ER: 2x10x3#  S/L abduction: 10x3#, x10 in full ROM      Neuromuscular re-education                Manual Therapy:                Therapeutic Activities:                  Home program: 3/8/21: pendulum, pulley, elbow flexion/extension, shoulder circles, shoulder isometrics    [facility] Portal  Treatment/Session Summary:    · Response to Treatment: Pt continues to make good progress. He has improved L shoulder ROM and strength. He still is limited with reaching across body and behind back, though this is improving. He will continue to benefit from skilled physical therapy to improving his strength and ROM of L shoulder.    · Communication/Consultation:  None today  · Equipment provided today: 3/8/21: doorway pulley. 4/21/21: blue band   · Recommendations/Intent for next treatment session: Next visit will focus on improving L shoulder pain and ROM.     Total Treatment Billable Duration: 55 minutes  PT Patient Time In/Time Out  Time In: 1015  Time Out: [facility]h, PT, DPT    Future Appointments   Date Time Provider Margo Leo   5[facility]2021 10:15 [facility]   5/11/2021 10:15 AM Dread Araiza [facility]   5/13/2021 11:45 AM Jossie Guevara DPT [facility]   5/18/2021 10:15 AM Dread Araiza [facility]   5/20/2021 10:15 AM Dread Araiza [facility]   5/25/2021 10:15 AM Dread Araiza [facility]   5/27/2021 10:15 AM Dread Araiza [facility]   6/30/2021  8:00 AM Dean Lal MD [facility]SA [facility]PA FPA   10/5/2021  9:00 AM Dean Lal MD SSA [facility]PA [facility]

## 2021-05-06 ENCOUNTER — HOSPITAL ENCOUNTER (OUTPATIENT)
Dept: PHYSICAL THERAPY | Age: 56
Discharge: HOME | End: 2021-05-06
Payer: COMMERCIAL

## 2021-05-06 PROCEDURE — 97016 VASOPNEUMATIC DEVICE THERAPY: CPT

## 2021-05-06 PROCEDURE — 97110 THERAPEUTIC EXERCISES: CPT

## 2021-05-06 NOTE — PROGRESS NOTES
[place] St[place]  : 1965  Primary: Brandon Espana Of Shawna Hanks*  Secondary:  Satya Harrison @ [address], [place].  [phone]   [phone]      OUTPATIENT PHYSICAL THERAPY: Daily Treatment Note 2021  Date of surgery: 21  Visit Count:  16    ICD-10: Treatment Diagnosis: Pain in left shoulder (M25.512) and Stiffness of left shoulder, not elsewhere classified (M25.612)  TREATMENT PLAN:  Effective Dates: 3/8/2021 TO 2021 (90 days). Frequency/Duration: 2 times a week for 90 Day(s)  GOALS: (Goals have been discussed and agreed upon with patient.)  Short-Term Functional Goals: Time Frame: 4 weeks  # Goal Status   1 Pt will confirm compliance with home program to facilitate improvement in function. MET     Discharge goals: Time frame: 12 weeks   # Goal Status   1 Pt will be able to elevate UE to at least 130° without symptoms in order to participate in ADLs such as reaching overhead and pulling shirt on.  3/25/21: L UE elevation to 110 °   4/15/21: L elevation to 130 °   21: L elevation to 132 °  MET   2 Pt will be able to reach over head to touch upper back without symptoms to be able to participate in ADLs such as reaching overhead and washing head. MET   3 Pt will be able to reach across body to touch spine of opposite scapula without symptoms to be able to participate in ADLs such as reaching tasks. 4/15/21: pt able to reach opposite scapula with assistance from R hand  21: pt able to reach spine of opposite scapula with no assistance but still tight PARTIALLY MET   4 Pt will be able to reach behind back to touch lumbar region without symptoms to be able to participate in dressing and toileting activities.   4/15/21: pt able to reach lumbar region with assistance form R   21: pt able to reach lumbar region with no assistance, but still very tight PARTIALLY MET        Pre-treatment Symptoms/Complaints:  Pt reports his shoulder has been stiff this week which he thinks is from doing the band exercises so much while he was gone. Pain: Initial:   0/10 Post Session:  No pain   Medications Last Reviewed: 5/6/2021  Updated Objective Findings: Below measures from initial evaluation unless otherwise noted. 3/8/21  Pain at worst: 10/10  Observation: Incisions healing well. Pt wearing sling with abduction pillow. ROM:    Left Right   Elevation (°) Passive flexion to ~60 ° per visual estimation 133   Behind neck reach Not tested To upper back   Behind back reach Not tested To upper lumbar region   Cross body reach Not tested To opposite scapula    R IR and ER WNL and pain-free. L IR to stomach, L ER to neutral.     UE ANDT: median-bias (+) L  Strength: 5/5 throughout on R. L not tested. DASH: 40/55.     3/25/21:  L UE elevation to 110 °     5/4/21:  L elevation to 132 °   Pt able to reach across body to opposite scapula and behind back to lumbar region but still very tight   TREATMENT:   THERAPEUTIC ACTIVITY: (see chart for minutes): Therapeutic activities per grid below to improve mobility, strength, balance and coordination. Required minimal visual, verbal and tactile cues to improve independence with functional mobility and activities. THERAPEUTIC EXERCISE: (see chart for minutes):  Exercises per grid below to improve mobility, strength, balance and coordination. Required minimal visual, verbal and tactile cues to promote proper body alignment and promote proper body mechanics. Progressed resistance, range, repetitions and complexity of movement as indicated. NEUROMUSCULAR RE-EDUCATION: (see chart for minutes):  Exercise/activities per grid below to improve balance, coordination, kinesthetic sense, posture, pain, and proprioception. Required minimal visual, verbal and tactile cues to promote motor control of left, upper extremity(s).   MANUAL THERAPY: (see chart for minutes): Joint mobilization, Soft tissue mobilization, skin mobilization, and muscle energy techniques were utilized and necessary because of the patient's restricted joint motion, restricted motion of soft tissue and pain . MODALITIES: (see chart for minutes):      *  Cold Pack Therapy in order to provide analgesia and reduce inflammation and edema. Date: 4/21/21 (visit 14)  5/4/21 (visit 15) PN 5/6/21 (visit 16)      Modalities: 15 min 15 min 15 min     Game ready to L shoulder, medium compression, coldest setting  15' 15'  15'     Therapeutic Exercise: 38 min 40 min 38 min      UBE: 3'/3' L4  Table flexion stretch: x3 holds   Doorway stretch: x3  Cross body stretch: x3  Behind back stretch: x3 holds   Supine press: 10x4#, 1012# bar, 10x18# bar, 10x24# bar  Bent arm fly: 2x10x2#   Landmine press with 10# on barbell: x10 both arms, 2x10 single arm  Landmine row: x10 with 10#, 2x6 with 35#  Cable triceps extension: 9f90r34#  Cable biceps curl: 0u89j35#  Band pull-apart: 2x10 red  Band ER: 2x10 red UBE: 3'/3' L4  Wall flexion stretch: x10 slides, x3 holds   Doorway stretch: x5  Cross body stretch: x3  DB flexion: 2x10x3#  DB abduction: 2x10x3#  Curls: 2x10x3#  BOR: 2x10x3#  DB reverse fly: 10x3#   Supine fly: 2x10x3#  Supine press: 10x18#, 10x24#, 10x34#   S/L ER: 2x10x3#  S/L abduction: 10x3#, x10 in full ROM UBE: 3'/3' L4  Wall flexion stretch: x5 holds   DB flexion: 2x10x2#  DB abduction: 2x10x2#  Curls: 2x10x2#  BOR: 2x10x2#  DB reverse fly: 2x10x2#   Triceps kick backs: 2x10x2# B  Supine fly: 2x10x2#  Supine press: 2x10x2# B   S/L ER: 3x10x2#  S/L abduction: 3x10x2#  PROM: 10' into IR and ER and flexion      Neuromuscular re-education                Manual Therapy:                Therapeutic Activities:                  Home program: 3/8/21: pendulum, pulley, elbow flexion/extension, shoulder circles, shoulder isometrics    [facility] Portal  Treatment/Session Summary:    · Response to Treatment: Pt reported his shoulder felt better after PROM and stretching. He was educated to continuing stretching over the weekend but to scale back band exercises to let his shoulder recover. · Communication/Consultation:  None today  · Equipment provided today: 3/8/21: doorway pulley. 4/21/21: blue band   · Recommendations/Intent for next treatment session: Next visit will focus on improving L shoulder pain and ROM.     Total Treatment Billable Duration: 53 minutes  PT Patient Time In/Time Out  Time In: 1015  Time Out: [address] Van PT, DPT    Future Appointments   Date Time Provider Margo Leo   5/11/2021 10:15 AM Luz Jaleel [facility]   5/13/2021 11:45 AM Young Daniels DPT [facility]   5/18/2021 10:15 AM Luz Jaleel [facility]   5/20/2021 10:15 AM Luz Flathead [facility]   5/25/2021 10:15 AM Luz Flathead [facility]   5/27/2021 10:15 AM Luz Jaleel [facility]   6/30/2021  8:00 AM Domi Lal Arm, MD [facility]PA [facility]PA   10/5/2021  9:00 AM Domi Lal Arm, MD [facility]PA FPA

## 2021-05-11 ENCOUNTER — HOSPITAL ENCOUNTER (OUTPATIENT)
Dept: PHYSICAL THERAPY | Age: 56
Discharge: HOME | End: 2021-05-11
Payer: COMMERCIAL

## 2021-05-11 PROCEDURE — 97110 THERAPEUTIC EXERCISES: CPT

## 2021-05-11 PROCEDURE — 97016 VASOPNEUMATIC DEVICE THERAPY: CPT

## 2021-05-11 NOTE — PROGRESS NOTES
Vasquez Cheathamiván  : 1965  Primary: Harjinder Jovel [facility] Demario*  Secondary:  Caitlyn Leal @ [address]  [address] [facility] [place].  [phone]   [phone]      OUTPATIENT PHYSICAL THERAPY: Daily Treatment Note 2021  Date of surgery: 21  Visit Count:  17    ICD-10: Treatment Diagnosis: Pain in left shoulder (M25.512) and Stiffness of left shoulder, not elsewhere classified (M25.612)  TREATMENT PLAN:  Effective Dates: 3/8/2021 TO 2021 (90 days). Frequency/Duration: 2 times a week for 90 Day(s)  GOALS: (Goals have been discussed and agreed upon with patient.)  Short-Term Functional Goals: Time Frame: 4 weeks  # Goal Status   1 Pt will confirm compliance with home program to facilitate improvement in function. MET     Discharge goals: Time frame: 12 weeks   # Goal Status   1 Pt will be able to elevate UE to at least 130° without symptoms in order to participate in ADLs such as reaching overhead and pulling shirt on.  3/25/21: L UE elevation to 110 °   4/15/21: L elevation to 130 °   21: L elevation to 132 °  MET   2 Pt will be able to reach over head to touch upper back without symptoms to be able to participate in ADLs such as reaching overhead and washing head. MET   3 Pt will be able to reach across body to touch spine of opposite scapula without symptoms to be able to participate in ADLs such as reaching tasks. 4/15/21: pt able to reach opposite scapula with assistance from R hand  21: pt able to reach spine of opposite scapula with no assistance but still tight PARTIALLY MET   4 Pt will be able to reach behind back to touch lumbar region without symptoms to be able to participate in dressing and toileting activities.   4/15/21: pt able to reach lumbar region with assistance form R   21: pt able to reach lumbar region with no assistance, but still very tight PARTIALLY MET        Pre-treatment Symptoms/Complaints:  Pt reports he rested his shoulder over the weekend and now his shoulder feels good again. Pain: Initial:   0/10 Post Session:  No pain   Medications Last Reviewed: 5/11/2021  Updated Objective Findings: Below measures from initial evaluation unless otherwise noted. 3/8/21  Pain at worst: 10/10  Observation: Incisions healing well. Pt wearing sling with abduction pillow. ROM:    Left Right   Elevation (°) Passive flexion to ~60 ° per visual estimation 133   Behind neck reach Not tested To upper back   Behind back reach Not tested To upper lumbar region   Cross body reach Not tested To opposite scapula    R IR and ER WNL and pain-free. L IR to stomach, L ER to neutral.     UE ANDT: median-bias (+) L  Strength: 5/5 throughout on R. L not tested. DASH: 40/55.     3/25/21:  L UE elevation to 110 °     5/4/21:  L elevation to 132 °   Pt able to reach across body to opposite scapula and behind back to lumbar region but still very tight   TREATMENT:   THERAPEUTIC ACTIVITY: (see chart for minutes): Therapeutic activities per grid below to improve mobility, strength, balance and coordination. Required minimal visual, verbal and tactile cues to improve independence with functional mobility and activities. THERAPEUTIC EXERCISE: (see chart for minutes):  Exercises per grid below to improve mobility, strength, balance and coordination. Required minimal visual, verbal and tactile cues to promote proper body alignment and promote proper body mechanics. Progressed resistance, range, repetitions and complexity of movement as indicated. NEUROMUSCULAR RE-EDUCATION: (see chart for minutes):  Exercise/activities per grid below to improve balance, coordination, kinesthetic sense, posture, pain, and proprioception. Required minimal visual, verbal and tactile cues to promote motor control of left, upper extremity(s).   MANUAL THERAPY: (see chart for minutes): Joint mobilization, Soft tissue mobilization, skin mobilization, and muscle energy techniques were utilized and necessary because of the patient's restricted joint motion, restricted motion of soft tissue and pain . MODALITIES: (see chart for minutes):      *  Cold Pack Therapy in order to provide analgesia and reduce inflammation and edema.      Date: 4/21/21 (visit 14)  5/4/21 (visit 15) PN 5/6/21 (visit 16)  5/11/21 (visit 17)     Modalities: 15 min 15 min 15 min 15 min    Game ready to L shoulder, medium compression, coldest setting  15' 15'  15' 15'     Therapeutic Exercise: 38 min 40 min 38 min 40 min     UBE: 3'/3' L4  Table flexion stretch: x3 holds   Doorway stretch: x3  Cross body stretch: x3  Behind back stretch: x3 holds   Supine press: 10x4#, 1012# bar, 10x18# bar, 10x24# bar  Bent arm fly: 2x10x2#   Landmine press with 10# on barbell: x10 both arms, 2x10 single arm  Landmine row: x10 with 10#, 2x6 with 35#  Cable triceps extension: 5h06u03#  Cable biceps curl: 9c87w01#  Band pull-apart: 2x10 red  Band ER: 2x10 red UBE: 3'/3' L4  Wall flexion stretch: x10 slides, x3 holds   Doorway stretch: x5  Cross body stretch: x3  DB flexion: 2x10x3#  DB abduction: 2x10x3#  Curls: 2x10x3#  BOR: 2x10x3#  DB reverse fly: 10x3#   Supine fly: 2x10x3#  Supine press: 10x18#, 10x24#, 10x34#   S/L ER: 2x10x3#  S/L abduction: 10x3#, x10 in full ROM UBE: 3'/3' L4  Wall flexion stretch: x5 holds   DB flexion: 2x10x2#  DB abduction: 2x10x2#  Curls: 2x10x2#  BOR: 2x10x2#  DB reverse fly: 2x10x2#   Triceps kick backs: 2x10x2# B  Supine fly: 2x10x2#  Supine press: 2x10x2# B   S/L ER: 3x10x2#  S/L abduction: 3x10x2#  PROM: 10' into IR and ER and flexion  UBE: 3'/3' L4  DB flexion: 2x10x3#  DB abduction: 2x10x3#  DB reverse fly: 2x10x3#   Curls: 10x10# B, [id number]# bar   Landmine BOR: 10x10#, 2x6x35# B  Supine press: 10x24#, 5h50n10# bar   ER in abduction: 2x10x3# B  Incline weight shifts: 2x5 B  Incline PU: 2x10   PROM: 5' into IR and ER and flexion       Neuromuscular re-education                Manual Therapy: Therapeutic Activities:                  Home program: 3/8/21: pendulum, pulley, elbow flexion/extension, shoulder circles, shoulder isometrics    MedBridge Portal  Treatment/Session Summary:    · Response to Treatment: Pt continues to progress well. He tolerated increased resistance with several exercises and reported no pain. · Communication/Consultation:  None today  · Equipment provided today: 3/8/21: doorway pulley. 4/21/21: blue band   · Recommendations/Intent for next treatment session: Next visit will focus on improving L shoulder pain and ROM.     Total Treatment Billable Duration: 55 minutes  PT Patient Time In/Time Out  Time In: 1015  Time Out: [address], PT, DPT    Future Appointments   Date Time Provider Margo Cheni   5/13/2021 11:45 AM Beth Booker DPT [facility]   5/18/2021 10:15 AM Stan Manzo [facility]   5/20/2021 10:15 AM Stan Manzo [facility]   5/25/2021 10:15 AM Stan Manzo [facility]   5/27/2021 10:15 AM Stantre Manzo [facility]   7/21/2021  9:00 AM Ricardo Lal MD [facility]   10/5/2021  9:00 AM Ricardo Lal MD [facility]

## 2021-05-13 ENCOUNTER — HOSPITAL ENCOUNTER (OUTPATIENT)
Dept: PHYSICAL THERAPY | Age: 56
Discharge: HOME | End: 2021-05-13
Payer: COMMERCIAL

## 2021-05-13 PROCEDURE — 97016 VASOPNEUMATIC DEVICE THERAPY: CPT

## 2021-05-13 PROCEDURE — 97110 THERAPEUTIC EXERCISES: CPT

## 2021-05-13 NOTE — PROGRESS NOTES
Fbay Aylin  : 1965  Primary: Donalynn [facility] [facility]*  Secondary:  [address] @ [place] [address].  [phone]   [phone]      OUTPATIENT PHYSICAL THERAPY: Daily Treatment Note 2021  Date of surgery: 21  Visit Count:  18    ICD-10: Treatment Diagnosis: Pain in left shoulder (M25.512) and Stiffness of left shoulder, not elsewhere classified (M25.612)  TREATMENT PLAN:  Effective Dates: 3/8/2021 TO 2021 (90 days). Frequency/Duration: 2 times a week for 90 Day(s)  GOALS: (Goals have been discussed and agreed upon with patient.)  Short-Term Functional Goals: Time Frame: 4 weeks  # Goal Status   1 Pt will confirm compliance with home program to facilitate improvement in function. MET     Discharge goals: Time frame: 12 weeks   # Goal Status   1 Pt will be able to elevate UE to at least 130° without symptoms in order to participate in ADLs such as reaching overhead and pulling shirt on.  3/25/21: L UE elevation to 110 °   4/15/21: L elevation to 130 °   21: L elevation to 132 °  MET   2 Pt will be able to reach over head to touch upper back without symptoms to be able to participate in ADLs such as reaching overhead and washing head. MET   3 Pt will be able to reach across body to touch spine of opposite scapula without symptoms to be able to participate in ADLs such as reaching tasks. 4/15/21: pt able to reach opposite scapula with assistance from R hand  21: pt able to reach spine of opposite scapula with no assistance but still tight PARTIALLY MET   4 Pt will be able to reach behind back to touch lumbar region without symptoms to be able to participate in dressing and toileting activities.   4/15/21: pt able to reach lumbar region with assistance form R   21: pt able to reach lumbar region with no assistance, but still very tight PARTIALLY MET        Pre-treatment Symptoms/Complaints:  Notes the shoulder has been feeling good. Still feels tight with reaching behind his head and behind his back. Pain: Initial:   0/10 Post Session:  No pain   Medications Last Reviewed: 5/13/2021  Updated Objective Findings: Below measures from initial evaluation unless otherwise noted. 3/8/21  Pain at worst: 10/10  Observation: Incisions healing well. Pt wearing sling with abduction pillow. ROM:    Left Right   Elevation (°) Passive flexion to ~60 ° per visual estimation 133   Behind neck reach Not tested To upper back   Behind back reach Not tested To upper lumbar region   Cross body reach Not tested To opposite scapula    R IR and ER WNL and pain-free. L IR to stomach, L ER to neutral.     UE ANDT: median-bias (+) L  Strength: 5/5 throughout on R. L not tested. DASH: 40/55.     3/25/21:  L UE elevation to 110 °     5/4/21:  L elevation to 132 °   Pt able to reach across body to opposite scapula and behind back to lumbar region but still very tight   TREATMENT:   THERAPEUTIC ACTIVITY: (see chart for minutes): Therapeutic activities per grid below to improve mobility, strength, balance and coordination. Required minimal visual, verbal and tactile cues to improve independence with functional mobility and activities. THERAPEUTIC EXERCISE: (see chart for minutes):  Exercises per grid below to improve mobility, strength, balance and coordination. Required minimal visual, verbal and tactile cues to promote proper body alignment and promote proper body mechanics. Progressed resistance, range, repetitions and complexity of movement as indicated. NEUROMUSCULAR RE-EDUCATION: (see chart for minutes):  Exercise/activities per grid below to improve balance, coordination, kinesthetic sense, posture, pain, and proprioception. Required minimal visual, verbal and tactile cues to promote motor control of left, upper extremity(s).   MANUAL THERAPY: (see chart for minutes): Joint mobilization, Soft tissue mobilization, skin mobilization, and muscle energy techniques were utilized and necessary because of the patient's restricted joint motion, restricted motion of soft tissue and pain . MODALITIES: (see chart for minutes):      *  Cold Pack Therapy in order to provide analgesia and reduce inflammation and edema.      Date: 4/21/21 (visit 14)  5/4/21 (visit 15) PN 5/6/21 (visit 16)  5/11/21 (visit 17)  5/13/21 (visit 18)   Modalities: 15 min 15 min 15 min 15 min 10 min   Game ready to L shoulder, medium compression, coldest setting  15' 15'  15' 15'  10'   Therapeutic Exercise: 38 min 40 min 38 min 40 min 45 min     UBE: 3'/3' L4  Table flexion stretch: x3 holds   Doorway stretch: x3  Cross body stretch: x3  Behind back stretch: x3 holds   Supine press: 10x4#, 1012# bar, 10x18# bar, 10x24# bar  Bent arm fly: 2x10x2#   Landmine press with 10# on barbell: x10 both arms, 2x10 single arm  Landmine row: x10 with 10#, 2x6 with 35#  Cable triceps extension: 2p35e77#  Cable biceps curl: 3o92j54#  Band pull-apart: 2x10 red  Band ER: 2x10 red UBE: 3'/3' L4  Wall flexion stretch: x10 slides, x3 holds   Doorway stretch: x5  Cross body stretch: x3  DB flexion: 2x10x3#  DB abduction: 2x10x3#  Curls: 2x10x3#  BOR: 2x10x3#  DB reverse fly: 10x3#   Supine fly: 2x10x3#  Supine press: 10x18#, 10x24#, 10x34#   S/L ER: 2x10x3#  S/L abduction: 10x3#, x10 in full ROM UBE: 3'/3' L4  Wall flexion stretch: x5 holds   DB flexion: 2x10x2#  DB abduction: 2x10x2#  Curls: 2x10x2#  BOR: 2x10x2#  DB reverse fly: 2x10x2#   Triceps kick backs: 2x10x2# B  Supine fly: 2x10x2#  Supine press: 2x10x2# B   S/L ER: 3x10x2#  S/L abduction: 3x10x2#  PROM: 10' into IR and ER and flexion  UBE: 3'/3' L4  DB flexion: 2x10x3#  DB abduction: 2x10x3#  DB reverse fly: 2x10x3#   Curls: 10x10# B, 4q51j58# bar   Landmine BOR: 10x10#, 2x6x35# B  Supine press: 10x24#, 9a88c83# bar   ER in abduction: 2x10x3# B  Incline weight shifts: 2x5 B  Incline PU: 2x10   PROM: 5' into IR and ER and flexion    UBE: 3'/3' L4  Wall flexion stretch x10 holds  Wall ER stretch x10 holds  Cross body stretch x10 holds  DB flexion: 2x10x3#  DB abduction: 2x10x3#   DB reverse fly: 2x10x3#  Curls: 4g83y18# bar  Landmine BOR: 3x6x35#  Supine press 2i83n92#  ER in abduction 3x10x3#  Incline wt shifts 3x5 B  Incline push up 3x5  Stretchin min into ER   Neuromuscular re-education                Manual Therapy:                Therapeutic Activities:                  Home program: 3/8/21: pendulum, pulley, elbow flexion/extension, shoulder circles, shoulder isometrics    MedBridge Portal  Treatment/Session Summary:    · Response to Treatment: Continues to have limited mobility into shoulder ER. Progressing well with strength of pressing. · Communication/Consultation:  None today  · Equipment provided today: 3/8/21: doorway pulley. 21: blue band   · Recommendations/Intent for next treatment session: Next visit will focus on improving L shoulder pain and ROM.     Total Treatment Billable Duration: 55 minutes  PT Patient Time In/Time Out  Time In: 9280  Time Out: [address] [facility], DPT, PT, DPT    Future Appointments   Date Time Provider Margo Newmanisti   2021 10:15 AM Meridee Ou [facility]   2021 10:15 AM Meridee Ou [facility]   2021 10:15 AM Meridee Ou [facility]   2021 10:15 AM Meridee Ou [facility]   2021 10:15 AM Meridee Ou [facility]   6/3/2021 10:15 AM Meridee Ou [facility]   2021 10:15 AM Meridee Ou [facility]   6/10/2021 10:15 AM Meridee Ou [facility]   2021  9:00 AM Gracy Lal MD [facility]PA FPA   10/5/2021  9:00 AM Gracy Lal MD [facility] FPA

## 2021-05-18 ENCOUNTER — HOSPITAL ENCOUNTER (OUTPATIENT)
Dept: PHYSICAL THERAPY | Age: 56
Discharge: HOME | End: 2021-05-18
Payer: COMMERCIAL

## 2021-05-18 PROCEDURE — 97110 THERAPEUTIC EXERCISES: CPT

## 2021-05-18 PROCEDURE — 97016 VASOPNEUMATIC DEVICE THERAPY: CPT

## 2021-05-18 NOTE — PROGRESS NOTES
Vikas Enriquezcamitahmina  : 1965  Primary: [facility] Demario*  Secondary:  [address] [place]  [phone]   [phone]      OUTPATIENT PHYSICAL THERAPY: Daily Treatment Note 2021  Date of surgery: 21  Visit Count:  19    ICD-10: Treatment Diagnosis: Pain in left shoulder (M25.512) and Stiffness of left shoulder, not elsewhere classified (M25.612)  TREATMENT PLAN:  Effective Dates: 3/8/2021 TO 2021 (90 days). Frequency/Duration: 2 times a week for 90 Day(s)  GOALS: (Goals have been discussed and agreed upon with patient.)  Short-Term Functional Goals: Time Frame: 4 weeks  # Goal Status   1 Pt will confirm compliance with home program to facilitate improvement in function. MET     Discharge goals: Time frame: 12 weeks   # Goal Status   1 Pt will be able to elevate UE to at least 130° without symptoms in order to participate in ADLs such as reaching overhead and pulling shirt on.  3/25/21: L UE elevation to 110 °   4/15/21: L elevation to 130 °   21: L elevation to 132 °  MET   2 Pt will be able to reach over head to touch upper back without symptoms to be able to participate in ADLs such as reaching overhead and washing head. MET   3 Pt will be able to reach across body to touch spine of opposite scapula without symptoms to be able to participate in ADLs such as reaching tasks. 4/15/21: pt able to reach opposite scapula with assistance from R hand  21: pt able to reach spine of opposite scapula with no assistance but still tight PARTIALLY MET   4 Pt will be able to reach behind back to touch lumbar region without symptoms to be able to participate in dressing and toileting activities.   4/15/21: pt able to reach lumbar region with assistance form R   21: pt able to reach lumbar region with no assistance, but still very tight PARTIALLY MET        Pre-treatment Symptoms/Complaints: Pt reports that his shoulder isn't painful but it has been stiff a few days after the higher level exercises. Pain: Initial:   0/10 Post Session:  No pain   Medications Last Reviewed: 5/18/2021  Updated Objective Findings: Below measures from initial evaluation unless otherwise noted. 3/8/21  Pain at worst: 10/10  Observation: Incisions healing well. Pt wearing sling with abduction pillow. ROM:    Left Right   Elevation (°) Passive flexion to ~60 ° per visual estimation 133   Behind neck reach Not tested To upper back   Behind back reach Not tested To upper lumbar region   Cross body reach Not tested To opposite scapula    R IR and ER WNL and pain-free. L IR to stomach, L ER to neutral.     UE ANDT: median-bias (+) L  Strength: 5/5 throughout on R. L not tested. DASH: 40/55.     3/25/21:  L UE elevation to 110 °     5/4/21:  L elevation to 132 °   Pt able to reach across body to opposite scapula and behind back to lumbar region but still very tight   TREATMENT:   THERAPEUTIC ACTIVITY: (see chart for minutes): Therapeutic activities per grid below to improve mobility, strength, balance and coordination. Required minimal visual, verbal and tactile cues to improve independence with functional mobility and activities. THERAPEUTIC EXERCISE: (see chart for minutes):  Exercises per grid below to improve mobility, strength, balance and coordination. Required minimal visual, verbal and tactile cues to promote proper body alignment and promote proper body mechanics. Progressed resistance, range, repetitions and complexity of movement as indicated. NEUROMUSCULAR RE-EDUCATION: (see chart for minutes):  Exercise/activities per grid below to improve balance, coordination, kinesthetic sense, posture, pain, and proprioception. Required minimal visual, verbal and tactile cues to promote motor control of left, upper extremity(s).   MANUAL THERAPY: (see chart for minutes): Joint mobilization, Soft tissue mobilization, skin mobilization, and muscle energy techniques were utilized and necessary because of the patient's restricted joint motion, restricted motion of soft tissue and pain . MODALITIES: (see chart for minutes):      *  Cold Pack Therapy in order to provide analgesia and reduce inflammation and edema. Date: 21 (visit 18) 21 (visit 19)       Modalities: 10 min 15 min      Game ready to L shoulder, medium compression, coldest setting  10' 15'      Therapeutic Exercise: 45 min  40 min       UBE: 3'/3' L4  Wall flexion stretch x10 holds  Wall ER stretch x10 holds  Cross body stretch x10 holds  DB flexion: 2x10x3#  DB abduction: 2x10x3#   DB reverse fly: 2x10x3#  Curls: 3k36p24# bar  Landmine BOR: 3x6x35#  Supine press 5x31z64#  ER in abduction 3x10x3#  Incline wt shifts 3x5 B  Incline push up 3x5  Stretchin min into ER UBE: 3'/3' L4  Wall flexion stretch 2x10 holds  DB flexion: 2x10x3#  DB abduction: 2x10x3#   DB reverse fly: 2x10x3#  Curls: 2g59p65#  Triceps kick backs: 5j21t15# B  Landmine OP: x10 B, 9k72x78# on barbell  Supine press: 10x45#, 2x5x65#  TRX BW row: x10 with slight inclination, x10 moderate inclination, 2x5 from ground with knees bent  S/L ER: 2x10x3# L  Stretchin min into ER      Neuromuscular re-education                Manual Therapy:                Therapeutic Activities:                  Home program: 3/8/21: pendulum, pulley, elbow flexion/extension, shoulder circles, shoulder isometrics    [facility]  Treatment/Session Summary:    · Response to Treatment: Pt advanced to new variations including landmine OP and BW row from gravity reduced positions. He reported these felt fine and no pain from them. · Communication/Consultation:  None today  · Equipment provided today: 3/8/21: doorway pulley. 21: blue band   · Recommendations/Intent for next treatment session: Next visit will focus on improving L shoulder pain and ROM.     Total Treatment Billable Duration: 55 minutes  PT Patient Time In/Time Out  Time In: 1013  Time Out: [address] Willian Hinson, PT, DPT    Future Appointments   Date Time Provider Margo Leo   5/20/2021 10:15 AM eKlley Morales [facility]   5/25/2021  7:00 PM [facility]   5/27/2021 10:15 AM Kelley Morales [facility]   6/1/2021 10:15 AM Kelley [facility]   6/3/2021 10:15 AM Kelley [facility]   6/8/2021 10:15 AM Kelley Morales [facility]   6/10/2021 10:15 AM Kelley Morales [facility]   7/21/2021  9:00 AM Shona Lal MD [facility] FPA FPA   10/5/2021  9:00 AM Shona Lal MD [facility] FPA FPA

## 2021-05-20 ENCOUNTER — HOSPITAL ENCOUNTER (OUTPATIENT)
Dept: PHYSICAL THERAPY | Age: 56
Discharge: HOME | End: 2021-05-20
Payer: COMMERCIAL

## 2021-05-20 PROCEDURE — 97110 THERAPEUTIC EXERCISES: CPT

## 2021-05-20 PROCEDURE — 97016 VASOPNEUMATIC DEVICE THERAPY: CPT

## 2021-05-20 NOTE — PROGRESS NOTES
Armani Bush  : 1965  Primary: Levon King Of Shawna Hanks*  Secondary:  [address], Andrey Bolanos.  [phone]   [phone]      OUTPATIENT PHYSICAL THERAPY: Daily Treatment Note 2021  Date of surgery: 21  Visit Count:  20    ICD-10: Treatment Diagnosis: Pain in left shoulder (M25.512) and Stiffness of left shoulder, not elsewhere classified (M25.612)  TREATMENT PLAN:  Effective Dates: 3/8/2021 TO 2021 (90 days). Frequency/Duration: 2 times a week for 90 Day(s)  GOALS: (Goals have been discussed and agreed upon with patient.)  Short-Term Functional Goals: Time Frame: 4 weeks  # Goal Status   1 Pt will confirm compliance with home program to facilitate improvement in function. MET     Discharge goals: Time frame: 12 weeks   # Goal Status   1 Pt will be able to elevate UE to at least 130° without symptoms in order to participate in ADLs such as reaching overhead and pulling shirt on.  3/25/21: L UE elevation to 110 °   4/15/21: L elevation to 130 °   21: L elevation to 132 °  MET   2 Pt will be able to reach over head to touch upper back without symptoms to be able to participate in ADLs such as reaching overhead and washing head. MET   3 Pt will be able to reach across body to touch spine of opposite scapula without symptoms to be able to participate in ADLs such as reaching tasks. 4/15/21: pt able to reach opposite scapula with assistance from R hand  21: pt able to reach spine of opposite scapula with no assistance but still tight PARTIALLY MET   4 Pt will be able to reach behind back to touch lumbar region without symptoms to be able to participate in dressing and toileting activities.   4/15/21: pt able to reach lumbar region with assistance form R   21: pt able to reach lumbar region with no assistance, but still very tight PARTIALLY MET        Pre-treatment Symptoms/Complaints: Pt reports his shoulder has been feeling better and has not had the stiffness it did earlier in the week. He has to leave at 1100 today. Pain: Initial:   0/10 Post Session:  No pain   Medications Last Reviewed: 5/20/2021  Updated Objective Findings: Below measures from initial evaluation unless otherwise noted. 3/8/21  Pain at worst: 10/10  Observation: Incisions healing well. Pt wearing sling with abduction pillow. ROM:    Left Right   Elevation (°) Passive flexion to ~60 ° per visual estimation 133   Behind neck reach Not tested To upper back   Behind back reach Not tested To upper lumbar region   Cross body reach Not tested To opposite scapula    R IR and ER WNL and pain-free. L IR to stomach, L ER to neutral.     UE ANDT: median-bias (+) L  Strength: 5/5 throughout on R. L not tested. DASH: 40/55.     3/25/21:  L UE elevation to 110 °     5/4/21:  L elevation to 132 °   Pt able to reach across body to opposite scapula and behind back to lumbar region but still very tight   TREATMENT:   THERAPEUTIC ACTIVITY: (see chart for minutes): Therapeutic activities per grid below to improve mobility, strength, balance and coordination. Required minimal visual, verbal and tactile cues to improve independence with functional mobility and activities. THERAPEUTIC EXERCISE: (see chart for minutes):  Exercises per grid below to improve mobility, strength, balance and coordination. Required minimal visual, verbal and tactile cues to promote proper body alignment and promote proper body mechanics. Progressed resistance, range, repetitions and complexity of movement as indicated. NEUROMUSCULAR RE-EDUCATION: (see chart for minutes):  Exercise/activities per grid below to improve balance, coordination, kinesthetic sense, posture, pain, and proprioception. Required minimal visual, verbal and tactile cues to promote motor control of left, upper extremity(s).   MANUAL THERAPY: (see chart for minutes): Joint mobilization, Soft tissue mobilization, skin mobilization, and muscle energy techniques were utilized and necessary because of the patient's restricted joint motion, restricted motion of soft tissue and pain . MODALITIES: (see chart for minutes):      *  Cold Pack Therapy in order to provide analgesia and reduce inflammation and edema. Date: 21 (visit 18) 21 (visit 19)  21 (visit 20)      Modalities: 10 min 15 min 10 min     Game ready to L shoulder, medium compression, coldest setting  10' 15' 10''     Therapeutic Exercise: 45 min  40 min 35 min      UBE: 3'/3' L4  Wall flexion stretch x10 holds  Wall ER stretch x10 holds  Cross body stretch x10 holds  DB flexion: 2x10x3#  DB abduction: 2x10x3#   DB reverse fly: 2x10x3#  Curls: [id number]# bar  Landmine BOR: 3x6x35#  Supine press [id number]#  ER in abduction 3x10x3#  Incline wt shifts 3x5 B  Incline push up 3x5  Stretchin min into ER UBE: 3'/3' L4  Wall flexion stretch 2x10 holds  DB flexion: 2x10x3#  DB abduction: 2x10x3#   DB reverse fly: 2x10x3#  Curls: 3y43l68#  Triceps kick backs: 8v96f71# B  Landmine OP: x10 B, [id number]# on barbell  Supine press: 10x45#, 2x5x65#  TRX BW row: x10 with slight inclination, x10 moderate inclination, 2x5 from ground with knees bent  S/L ER: 2x10x3# L  Stretchin min into ER UBE: 3'/3' L4  Wall flexion stretch 2x3 holds  DB flexion: 3x10x4#  DB abduction: 3x10x4#   DB reverse fly: 3x10x4#  Incline PU: 3x5  TRX BW row: 3x5 from ground with knees bent  Bird dog: 2x5 B  Plank: 2x10 s holds   Cross body stretch: x3 holds  Behind neck stretch: x3 holds  Behind back stretch: x3 holds  S/L ER: 2x10x4# L     Neuromuscular re-education                Manual Therapy:                Therapeutic Activities:                  Home program: 3/8/21: pendulum, pulley, elbow flexion/extension, shoulder circles, shoulder isometrics    [facility] Portal  Treatment/Session Summary:    · Response to Treatment: Pt tolerated advancement of exercises well today. He cancelled next Tuesday appointment so he will be seen 1 time next week. · Communication/Consultation:  None today  · Equipment provided today: 3/8/21: doorway pulley. 4/21/21: blue band   · Recommendations/Intent for next treatment session: Next visit will focus on improving L shoulder pain and ROM.     Total Treatment Billable Duration: 45 minutes  PT Patient Time In/Time Out  Time In: 1015  Time Out: [address], PT, DPT    Future Appointments   Date Time Provider Margo Leo   5/25/2021  7:00 PM Sara Russ [facility]   5/27/2021 10:15 AM Sara [facility]   6/1/2021 10:15 AM Sara HODGESOFR [facility]   6/3/2021 10:15 AM Sara HODGESOFR [facility]   6/8/2021 10:15 AM Sara [facility]OD[facility]ESOFR [facility]   6/10/2021 10:15 AM Sara HODGESOFR [facility]   7/21/2021  9:00 AM Lizeth Lal MD [facility]   10/5/2021  9:00 AM Lizeth Lal MD [facility]

## 2021-05-25 ENCOUNTER — APPOINTMENT (OUTPATIENT)
Dept: PHYSICAL THERAPY | Age: 56
End: 2021-05-25
Payer: COMMERCIAL

## 2021-05-27 ENCOUNTER — HOSPITAL ENCOUNTER (OUTPATIENT)
Dept: PHYSICAL THERAPY | Age: 56
Discharge: HOME | End: 2021-05-27
Payer: COMMERCIAL

## 2021-05-27 PROCEDURE — 97110 THERAPEUTIC EXERCISES: CPT

## 2021-05-27 PROCEDURE — 97016 VASOPNEUMATIC DEVICE THERAPY: CPT

## 2021-05-27 NOTE — PROGRESS NOTES
Gen Beckfordjett  : 1965  Primary: Emma Toro Of Shawna Hanks*  Secondary:  [address] @ [address]  [facility] [address]  [phone]   [phone]      OUTPATIENT PHYSICAL THERAPY: Daily Treatment Note 2021  Date of surgery: 21  Visit Count:  21    ICD-10: Treatment Diagnosis: Pain in left shoulder (M25.512) and Stiffness of left shoulder, not elsewhere classified (M25.612)  TREATMENT PLAN:  Effective Dates: 3/8/2021 TO 2021 (90 days). Frequency/Duration: 2 times a week for 90 Day(s)  GOALS: (Goals have been discussed and agreed upon with patient.)  Short-Term Functional Goals: Time Frame: 4 weeks  # Goal Status   1 Pt will confirm compliance with home program to facilitate improvement in function. MET     Discharge goals: Time frame: 12 weeks   # Goal Status   1 Pt will be able to elevate UE to at least 130° without symptoms in order to participate in ADLs such as reaching overhead and pulling shirt on.  3/25/21: L UE elevation to 110 °   4/15/21: L elevation to 130 °   21: L elevation to 132 °  MET   2 Pt will be able to reach over head to touch upper back without symptoms to be able to participate in ADLs such as reaching overhead and washing head. MET   3 Pt will be able to reach across body to touch spine of opposite scapula without symptoms to be able to participate in ADLs such as reaching tasks. 4/15/21: pt able to reach opposite scapula with assistance from R hand  21: pt able to reach spine of opposite scapula with no assistance but still tight PARTIALLY MET   4 Pt will be able to reach behind back to touch lumbar region without symptoms to be able to participate in dressing and toileting activities.   4/15/21: pt able to reach lumbar region with assistance form R   21: pt able to reach lumbar region with no assistance, but still very tight  21: pt able to reach to lumbar region with no assistance now with greater ease and can tuck in shirt and don belt  MET        Pre-treatment Symptoms/Complaints: Pt reports his shoulder has been tight on the weekends but he knows it's because he has been doing a lot of yardwork. Pain: Initial:   0/10 Post Session:  No pain   Medications Last Reviewed: 5/27/2021  Updated Objective Findings: Below measures from initial evaluation unless otherwise noted. 3/8/21  Pain at worst: 10/10  Observation: Incisions healing well. Pt wearing sling with abduction pillow. ROM:    Left Right   Elevation (°) Passive flexion to ~60 ° per visual estimation 133   Behind neck reach Not tested To upper back   Behind back reach Not tested To upper lumbar region   Cross body reach Not tested To opposite scapula    R IR and ER WNL and pain-free. L IR to stomach, L ER to neutral.     UE ANDT: median-bias (+) L  Strength: 5/5 throughout on R. L not tested. DASH: 40/55.     3/25/21:  L UE elevation to 110 °     5/4/21:  L elevation to 132 °   Pt able to reach across body to opposite scapula and behind back to lumbar region but still very tight   TREATMENT:   THERAPEUTIC ACTIVITY: (see chart for minutes): Therapeutic activities per grid below to improve mobility, strength, balance and coordination. Required minimal visual, verbal and tactile cues to improve independence with functional mobility and activities. THERAPEUTIC EXERCISE: (see chart for minutes):  Exercises per grid below to improve mobility, strength, balance and coordination. Required minimal visual, verbal and tactile cues to promote proper body alignment and promote proper body mechanics. Progressed resistance, range, repetitions and complexity of movement as indicated. NEUROMUSCULAR RE-EDUCATION: (see chart for minutes):  Exercise/activities per grid below to improve balance, coordination, kinesthetic sense, posture, pain, and proprioception.   Required minimal visual, verbal and tactile cues to promote motor control of left, upper extremity(s). MANUAL THERAPY: (see chart for minutes): Joint mobilization, Soft tissue mobilization, skin mobilization, and muscle energy techniques were utilized and necessary because of the patient's restricted joint motion, restricted motion of soft tissue and pain . MODALITIES: (see chart for minutes):      *  Cold Pack Therapy in order to provide analgesia and reduce inflammation and edema.      Date: 21 (visit 18) 21 (visit 19)  21 (visit 20)  21 (visit 21)     Modalities: 10 min 15 min 10 min 15 min    Game ready to L shoulder, medium compression, coldest setting  10' 15' 10'' 15'     Therapeutic Exercise: 45 min  40 min 35 min 40 min     UBE: 3'/3' L4  Wall flexion stretch x10 holds  Wall ER stretch x10 holds  Cross body stretch x10 holds  DB flexion: 2x10x3#  DB abduction: 2x10x3#   DB reverse fly: 2x10x3#  Curls: [id number]# bar  Landmine BOR: 3x6x35#  Supine press 5v24h86#  ER in abduction 3x10x3#  Incline wt shifts 3x5 B  Incline push up 3x5  Stretchin min into ER UBE: 3'/3' L4  Wall flexion stretch 2x10 holds  DB flexion: 2x10x3#  DB abduction: 2x10x3#   DB reverse fly: 2x10x3#  Curls: 9h36t44#  Triceps kick backs: 7w79i01# B  Landmine OP: x10 B, [id number]# on barbell  Supine press: 10x45#, 2x5x65#  TRX BW row: x10 with slight inclination, x10 moderate inclination, 2x5 from ground with knees bent  S/L ER: 2x10x3# L  Stretchin min into ER UBE: 3'/3' L4  Wall flexion stretch 2x3 holds  DB flexion: 3x10x4#  DB abduction: 3x10x4#   DB reverse fly: 3x10x4#  Incline PU: 3x5  TRX BW row: 3x5 from ground with knees bent  Bird dog: 2x5 B  Plank: 2x10 s holds   Cross body stretch: x3 holds  Behind neck stretch: x3 holds  Behind back stretch: x3 holds  S/L ER: 2x10x4# L UBE: 3'/3' L4  Wall flexion stretch: x5  DB flexion: 2x10x4#  DB abduction: 2x10x4#   DB reverse fly: 2x10x4#  TRX BW row: 2x6 from ground with knees bent, second set lower  Bird dog: 2x5 B  PU plus from plank position: 2x10  Depth limited PU from knees: 2x5   Ball throw into trampoline: 2x10   ER in abduction: 2x10x4#  Cross body stretch: 2x3 holds  Behind neck stretch: 2x3 holds  Behind back stretch: 2x3 holds  Doorway stretch: x3 holds    Neuromuscular re-education                Manual Therapy:                Therapeutic Activities:                  Home program: 3/8/21: pendulum, pulley, elbow flexion/extension, shoulder circles, shoulder isometrics    [facility] Portal  Treatment/Session Summary:    · Response to Treatment: Pt advanced to greater difficulty exercises and tolerated these well. He continues to demonstrate improving ROM and functional capacity. · Communication/Consultation:  None today  · Equipment provided today: 3/8/21: doorway pulley. 4/21/21: blue band   · Recommendations/Intent for next treatment session: Next visit will focus on improving L shoulder pain and ROM.     Total Treatment Billable Duration: 55 minutes  PT Patient Time In/Time Out  Time In: 1015  Time Out: [address] Manuela Van PT, DPT    Future Appointments   Date Time Provider Margo Cheni   6/1/2021 10:15 AM Marilee Aw [facility]   6/3/2021 10:15 AM [facility]   6/8/2021 10:15 AM Marilee Aw [facility]   6/10/2021 10:15 AM Marilee Aw [facility]   7/21/2021  9:00 AM Dangler, Cherylene Boers, MD [facility]   10/5/2021  9:00 AM Dangler, Cherylene Boers, MD [facility]

## 2021-06-01 ENCOUNTER — HOSPITAL ENCOUNTER (OUTPATIENT)
Dept: PHYSICAL THERAPY | Age: 56
Discharge: HOME | End: 2021-06-01
Payer: COMMERCIAL

## 2021-06-01 PROCEDURE — 97110 THERAPEUTIC EXERCISES: CPT

## 2021-06-01 PROCEDURE — 97016 VASOPNEUMATIC DEVICE THERAPY: CPT

## 2021-06-01 NOTE — PROGRESS NOTES
Ailyn Stevens  : 1965  Primary: Willem Fox Of Shawna Hanks*  Secondary:  Angelica Chapin @ [address], [address] [place] [address]  [phone]   [phone]      OUTPATIENT PHYSICAL THERAPY: Daily Treatment Note 2021  Date of surgery: 21  Visit Count:  22    ICD-10: Treatment Diagnosis: Pain in left shoulder (M25.512) and Stiffness of left shoulder, not elsewhere classified (M25.612)  TREATMENT PLAN:  Effective Dates: 3/8/2021 TO 2021 (90 days). Frequency/Duration: 2 times a week for 90 Day(s)  GOALS: (Goals have been discussed and agreed upon with patient.)  Short-Term Functional Goals: Time Frame: 4 weeks  # Goal Status   1 Pt will confirm compliance with home program to facilitate improvement in function. MET     Discharge goals: Time frame: 12 weeks   # Goal Status   1 Pt will be able to elevate UE to at least 130° without symptoms in order to participate in ADLs such as reaching overhead and pulling shirt on.  3/25/21: L UE elevation to 110 °   4/15/21: L elevation to 130 °   21: L elevation to 132 °  MET   2 Pt will be able to reach over head to touch upper back without symptoms to be able to participate in ADLs such as reaching overhead and washing head. MET   3 Pt will be able to reach across body to touch spine of opposite scapula without symptoms to be able to participate in ADLs such as reaching tasks. 4/15/21: pt able to reach opposite scapula with assistance from R hand  21: pt able to reach spine of opposite scapula with no assistance but still tight PARTIALLY MET   4 Pt will be able to reach behind back to touch lumbar region without symptoms to be able to participate in dressing and toileting activities.   4/15/21: pt able to reach lumbar region with assistance form R   21: pt able to reach lumbar region with no assistance, but still very tight  21: pt able to reach to lumbar region with no assistance now with greater ease and can tuck in shirt and don belt  MET        Pre-treatment Symptoms/Complaints: Pt reports his shoulder was a little tight after last session but he stretched and it eased up. He wakes up with it slightly tight whenever he sleeps on L side but is continuing to try to gradually sleep on L to get it used to pressure. Pain: Initial:   0/10 Post Session:  No pain   Medications Last Reviewed: 6/1/2021  Updated Objective Findings: Below measures from initial evaluation unless otherwise noted. 3/8/21  Pain at worst: 10/10  Observation: Incisions healing well. Pt wearing sling with abduction pillow. ROM:    Left Right   Elevation (°) Passive flexion to ~60 ° per visual estimation 133   Behind neck reach Not tested To upper back   Behind back reach Not tested To upper lumbar region   Cross body reach Not tested To opposite scapula    R IR and ER WNL and pain-free. L IR to stomach, L ER to neutral.     UE ANDT: median-bias (+) L  Strength: 5/5 throughout on R. L not tested. DASH: 40/55.     3/25/21:  L UE elevation to 110 °     5/4/21:  L elevation to 132 °   Pt able to reach across body to opposite scapula and behind back to lumbar region but still very tight   TREATMENT:   THERAPEUTIC ACTIVITY: (see chart for minutes): Therapeutic activities per grid below to improve mobility, strength, balance and coordination. Required minimal visual, verbal and tactile cues to improve independence with functional mobility and activities. THERAPEUTIC EXERCISE: (see chart for minutes):  Exercises per grid below to improve mobility, strength, balance and coordination. Required minimal visual, verbal and tactile cues to promote proper body alignment and promote proper body mechanics. Progressed resistance, range, repetitions and complexity of movement as indicated.   NEUROMUSCULAR RE-EDUCATION: (see chart for minutes):  Exercise/activities per grid below to improve balance, coordination, kinesthetic sense, posture, pain, and proprioception. Required minimal visual, verbal and tactile cues to promote motor control of left, upper extremity(s). MANUAL THERAPY: (see chart for minutes): Joint mobilization, Soft tissue mobilization, skin mobilization, and muscle energy techniques were utilized and necessary because of the patient's restricted joint motion, restricted motion of soft tissue and pain . MODALITIES: (see chart for minutes):      *  Cold Pack Therapy in order to provide analgesia and reduce inflammation and edema.      Date: 21 (visit 18) 21 (visit 19)  21 (visit 20)  21 (visit 21)  21 (visit 22)    Modalities: 10 min 15 min 10 min 15 min 15 min   Game ready to L shoulder, medium compression, coldest setting  10' 15' 10'' 15'  15'   Therapeutic Exercise: 45 min  40 min 35 min 40 min 40 min    UBE: 3'/3' L4  Wall flexion stretch x10 holds  Wall ER stretch x10 holds  Cross body stretch x10 holds  DB flexion: 2x10x3#  DB abduction: 2x10x3#   DB reverse fly: 2x10x3#  Curls: [id number]# bar  Landmine BOR: 3x6x35#  Supine press 2x64i03#  ER in abduction 3x10x3#  Incline wt shifts 3x5 B  Incline push up 3x5  Stretchin min into ER UBE: 3'/3' L4  Wall flexion stretch 2x10 holds  DB flexion: 2x10x3#  DB abduction: 2x10x3#   DB reverse fly: 2x10x3#  Curls: 3d11d01#  Triceps kick backs: 4j56s93# B  Landmine OP: x10 B, 6i06w53# on barbell  Supine press: 10x45#, 2x5x65#  TRX BW row: x10 with slight inclination, x10 moderate inclination, 2x5 from ground with knees bent  S/L ER: 2x10x3# L  Stretchin min into ER UBE: 3'/3' L4  Wall flexion stretch 2x3 holds  DB flexion: 3x10x4#  DB abduction: 3x10x4#   DB reverse fly: 3x10x4#  Incline PU: 3x5  TRX BW row: 3x5 from ground with knees bent  Bird dog: 2x5 B  Plank: 2x10 s holds   Cross body stretch: x3 holds  Behind neck stretch: x3 holds  Behind back stretch: x3 holds  S/L ER: 2x10x4# L UBE: 3'/3' L4  Wall flexion stretch: x5  DB flexion: 2x10x4#  DB abduction: 2x10x4#   DB reverse fly: 2x10x4#  TRX BW row: 2x6 from ground with knees bent, second set lower  Bird dog: 2x5 B  PU plus from plank position: 2x10  Depth limited PU from knees: 2x5   Ball throw into trampoline: 2x10   ER in abduction: 2x10x4#  Cross body stretch: 2x3 holds  Behind neck stretch: 2x3 holds  Behind back stretch: 2x3 holds  Doorway stretch: x3 holds UBE: 3'/3' L4  Wall flexion stretch: x5  DB flexion: 3x7x4#  DB abduction: 3x7x4#   DB reverse fly: 3x7x4#  TRX BW row: 2x5 from ground with knees bent, x5 from lying on ground  Bird dog: 2x5 B  PU plus from plank position: x6  Depth limited PU from knees: x8  Full PU with depth limited + PU plus: x4, x5  Ball throw into trampoline: 2x10   ER in abduction: 2x10x4#  Cross body stretch: 2x3 holds  Behind neck stretch: 2x3 holds  Behind back stretch: 2x3 holds  Doorway stretch: x3 holds  S/L ER: 2x10x4#  S/L abduction: 2x10x4#   Pt given printout of exercises and stretches including lower body stretch and shoulder stretches and exercises. Neuromuscular re-education                Manual Therapy:                Therapeutic Activities:                  Home program: 3/8/21: pendulum, pulley, elbow flexion/extension, shoulder circles, shoulder isometrics    [facility] Portal  Treatment/Session Summary:    · Response to Treatment: Pt tolerated exercises well and was able to progress to depth limited PU in full plank position today. · Communication/Consultation:  None today  · Equipment provided today: 3/8/21: doorway pulley. 4/21/21: blue band   · Recommendations/Intent for next treatment session: Next visit will focus on improving L shoulder pain and ROM.     Total Treatment Billable Duration: 55 minutes  PT Patient Time In/Time Out  Time In: 1015  Time Out: [address] González Buchanan Dears, PT, DPT    Future Appointments   Date Time Provider Margo Leo   6/3/2021 10:15 AM Bozena Caceres [facility]   6/8/2021 10:15 AM Bozena Caceres [facility] 6/10/2021 10:15 AM Libia Malik [facility]   7/21/2021  9:00 AM Edith Lal MD [facility]   10/5/2021  9:00 AM Edith Lal MD [facility]

## 2021-06-03 ENCOUNTER — HOSPITAL ENCOUNTER (OUTPATIENT)
Dept: PHYSICAL THERAPY | Age: 56
Discharge: HOME | End: 2021-06-03
Payer: COMMERCIAL

## 2021-06-03 PROCEDURE — 97016 VASOPNEUMATIC DEVICE THERAPY: CPT

## 2021-06-03 PROCEDURE — 97110 THERAPEUTIC EXERCISES: CPT

## 2021-06-03 NOTE — PROGRESS NOTES
Gen Duglas  : 1965  Primary: Emma Toro [facility]*  Secondary:  [address] @ [place] [address].  [phone]   [phone]      OUTPATIENT PHYSICAL THERAPY: Daily Treatment Note 6/3/2021  Date of surgery: 21  Visit Count:  23    ICD-10: Treatment Diagnosis: Pain in left shoulder (M25.512) and Stiffness of left shoulder, not elsewhere classified (M25.612)  TREATMENT PLAN:  Effective Dates: 3/8/2021 TO 2021 (90 days). Frequency/Duration: 2 times a week for 90 Day(s)  GOALS: (Goals have been discussed and agreed upon with patient.)  Short-Term Functional Goals: Time Frame: 4 weeks  # Goal Status   1 Pt will confirm compliance with home program to facilitate improvement in function. MET     Discharge goals: Time frame: 12 weeks   # Goal Status   1 Pt will be able to elevate UE to at least 130° without symptoms in order to participate in ADLs such as reaching overhead and pulling shirt on.  3/25/21: L UE elevation to 110 °   4/15/21: L elevation to 130 °   21: L elevation to 132 °  MET   2 Pt will be able to reach over head to touch upper back without symptoms to be able to participate in ADLs such as reaching overhead and washing head. MET   3 Pt will be able to reach across body to touch spine of opposite scapula without symptoms to be able to participate in ADLs such as reaching tasks. 4/15/21: pt able to reach opposite scapula with assistance from R hand  21: pt able to reach spine of opposite scapula with no assistance but still tight PARTIALLY MET   4 Pt will be able to reach behind back to touch lumbar region without symptoms to be able to participate in dressing and toileting activities.   4/15/21: pt able to reach lumbar region with assistance form R   21: pt able to reach lumbar region with no assistance, but still very tight  21: pt able to reach to lumbar region with no assistance now with greater ease and can tuck in shirt and don belt  MET        Pre-treatment Symptoms/Complaints: Pt reports his shoulder was pretty tight after last workout due to high level exercises. He sees MD tomorrow. Pain: Initial:   0/10 Post Session:  No pain   Medications Last Reviewed: 6/3/2021  Updated Objective Findings: Below measures from initial evaluation unless otherwise noted. 3/8/21  Pain at worst: 10/10  Observation: Incisions healing well. Pt wearing sling with abduction pillow. ROM:    Left Right   Elevation (°) Passive flexion to ~60 ° per visual estimation 133   Behind neck reach Not tested To upper back   Behind back reach Not tested To upper lumbar region   Cross body reach Not tested To opposite scapula    R IR and ER WNL and pain-free. L IR to stomach, L ER to neutral.     UE ANDT: median-bias (+) L  Strength: 5/5 throughout on R. L not tested. DASH: 40/55.     3/25/21:  L UE elevation to 110 °     5/4/21:  L elevation to 132 °   Pt able to reach across body to opposite scapula and behind back to lumbar region but still very tight   TREATMENT:   THERAPEUTIC ACTIVITY: (see chart for minutes): Therapeutic activities per grid below to improve mobility, strength, balance and coordination. Required minimal visual, verbal and tactile cues to improve independence with functional mobility and activities. THERAPEUTIC EXERCISE: (see chart for minutes):  Exercises per grid below to improve mobility, strength, balance and coordination. Required minimal visual, verbal and tactile cues to promote proper body alignment and promote proper body mechanics. Progressed resistance, range, repetitions and complexity of movement as indicated. NEUROMUSCULAR RE-EDUCATION: (see chart for minutes):  Exercise/activities per grid below to improve balance, coordination, kinesthetic sense, posture, pain, and proprioception.   Required minimal visual, verbal and tactile cues to promote motor control of left, upper extremity(s). MANUAL THERAPY: (see chart for minutes): Joint mobilization, Soft tissue mobilization, skin mobilization, and muscle energy techniques were utilized and necessary because of the patient's restricted joint motion, restricted motion of soft tissue and pain . MODALITIES: (see chart for minutes):      *  Cold Pack Therapy in order to provide analgesia and reduce inflammation and edema. Date: 5/27/21 (visit 21)  6/1/21 (visit 22)  6/3/21 (visit 23)     Modalities: 15 min 15 min 15 min    Game ready to L shoulder, medium compression, coldest setting  15'  15' 15'    Therapeutic Exercise: 40 min 40 min 43 min     UBE: 3'/3' L4  Wall flexion stretch: x5  DB flexion: 2x10x4#  DB abduction: 2x10x4#   DB reverse fly: 2x10x4#  TRX BW row: 2x6 from ground with knees bent, second set lower  Bird dog: 2x5 B  PU plus from plank position: 2x10  Depth limited PU from knees: 2x5   Ball throw into trampoline: 2x10   ER in abduction: 2x10x4#  Cross body stretch: 2x3 holds  Behind neck stretch: 2x3 holds  Behind back stretch: 2x3 holds  Doorway stretch: x3 holds UBE: 3'/3' L4  Wall flexion stretch: x5  DB flexion: 3x7x4#  DB abduction: 3x7x4#   DB reverse fly: 3x7x4#  TRX BW row: 2x5 from ground with knees bent, x5 from lying on ground  Bird dog: 2x5 B  PU plus from plank position: x6  Depth limited PU from knees: x8  Full PU with depth limited + PU plus: x4, x5  Ball throw into trampoline: 2x10   ER in abduction: 2x10x4#  Cross body stretch: 2x3 holds  Behind neck stretch: 2x3 holds  Behind back stretch: 2x3 holds  Doorway stretch: x3 holds  S/L ER: 2x10x4#  S/L abduction: 2x10x4#   Pt given printout of exercises and stretches including lower body stretch and shoulder stretches and exercises.  UBE: 3'/3' L4  PROM and stretching by clinician: x10' into ER and IR  Shoulder raises: 3x10x5# B anterior and laterally  Row: 10x25#, 10x35#, 10x45#   Lat pull-down in kneeling: 10x45#, [id number]#   Cable fly: 10x10# B, [id number]# B  Triceps pushdown: [id number]#   DB curls: [id number]# B  S/L ER: 2x10x3#   S/L abduction: 2x10x3#     Neuromuscular re-education              Manual Therapy:              Therapeutic Activities:                Home program: 3/8/21: pendulum, pulley, elbow flexion/extension, shoulder circles, shoulder isometrics    [facility] Portal  Treatment/Session Summary:    · Response to Treatment: Pt tolerated exercises well, with an emphasis on stretching today. He continues to progress well and will see MD tomorrow. · Communication/Consultation:  None today  · Equipment provided today: 3/8/21: doorway pulley. 21: blue band   · Recommendations/Intent for next treatment session: Next visit will focus on improving L shoulder pain and ROM.     Total Treatment Billable Duration: 58 minutes  PT Patient Time In/Time Out  Time In: 2569  Time Out: [address] [facility], PT, DPT    Future Appointments   Date Time Provider Margo Cheni   2021 10:15 AM Aman Manzanares [facility]   6/10/2021 10:15 AM Aman Manzanares [facility]   2021  9:00 AM North Lal MD [facility]PA FPA   10/5/2021  9:00 AM North Lal MD [facility] FPA FPA

## 2021-06-08 ENCOUNTER — HOSPITAL ENCOUNTER (OUTPATIENT)
Dept: PHYSICAL THERAPY | Age: 56
Discharge: HOME | End: 2021-06-08
Payer: COMMERCIAL

## 2021-06-08 PROCEDURE — 97110 THERAPEUTIC EXERCISES: CPT

## 2021-06-08 PROCEDURE — 97016 VASOPNEUMATIC DEVICE THERAPY: CPT

## 2021-06-08 NOTE — PROGRESS NOTES
Gurjit Andersen  : 1965  Primary: Caye Ajit [facility]anks*  Secondary:  [address] @ [address], [address] [place] [address]  [phone]   [phone]      OUTPATIENT PHYSICAL THERAPY: Daily Treatment and discharge Note 2021  Date of surgery: 21  Visit Count:  24    ICD-10: Treatment Diagnosis: Pain in left shoulder (M25.512) and Stiffness of left shoulder, not elsewhere classified (M25.612)  TREATMENT PLAN:  Effective Dates: 3/8/2021 TO 2021 (90 days). Frequency/Duration: 2 times a week for 90 Day(s)  GOALS: (Goals have been discussed and agreed upon with patient.)  Short-Term Functional Goals: Time Frame: 4 weeks  # Goal Status   1 Pt will confirm compliance with home program to facilitate improvement in function. MET     Discharge goals: Time frame: 12 weeks   # Goal Status   1 Pt will be able to elevate UE to at least 130° without symptoms in order to participate in ADLs such as reaching overhead and pulling shirt on.  3/25/21: L UE elevation to 110 °   4/15/21: L elevation to 130 °   21: L elevation to 132 °  MET   2 Pt will be able to reach over head to touch upper back without symptoms to be able to participate in ADLs such as reaching overhead and washing head. MET   3 Pt will be able to reach across body to touch spine of opposite scapula without symptoms to be able to participate in ADLs such as reaching tasks. 4/15/21: pt able to reach opposite scapula with assistance from R hand  21: pt able to reach spine of opposite scapula with no assistance but still tight MET   4 Pt will be able to reach behind back to touch lumbar region without symptoms to be able to participate in dressing and toileting activities.   4/15/21: pt able to reach lumbar region with assistance form R   21: pt able to reach lumbar region with no assistance, but still very tight  21: pt able to reach to lumbar region with no assistance now with greater ease and can tuck in shirt and don belt  MET        Pre-treatment Symptoms/Complaints: Pt reports his shoulder is tight from painting over the weekend. He saw MD last week who reported he was doing well and could finish up with PT today. Pain: Initial:   0/10 Post Session:  No pain   Medications Last Reviewed: 6/8/2021  Updated Objective Findings: Below measures from initial evaluation unless otherwise noted. 3/8/21  Pain at worst: 10/10  Observation: Incisions healing well. Pt wearing sling with abduction pillow. ROM:    Left Right   Elevation (°) Passive flexion to ~60 ° per visual estimation 133   Behind neck reach Not tested To upper back   Behind back reach Not tested To upper lumbar region   Cross body reach Not tested To opposite scapula    R IR and ER WNL and pain-free. L IR to stomach, L ER to neutral.     UE ANDT: median-bias (+) L  Strength: 5/5 throughout on R. L not tested. DASH: 40/55.     3/25/21:  L UE elevation to 110 °     5/4/21:  L elevation to 132 °   Pt able to reach across body to opposite scapula and behind back to lumbar region but still very tight     6/8/21:   L flexion to 145 °   Pt aleshia to reach across body to opposite scapula  TREATMENT:   THERAPEUTIC ACTIVITY: (see chart for minutes): Therapeutic activities per grid below to improve mobility, strength, balance and coordination. Required minimal visual, verbal and tactile cues to improve independence with functional mobility and activities. THERAPEUTIC EXERCISE: (see chart for minutes):  Exercises per grid below to improve mobility, strength, balance and coordination. Required minimal visual, verbal and tactile cues to promote proper body alignment and promote proper body mechanics. Progressed resistance, range, repetitions and complexity of movement as indicated.   NEUROMUSCULAR RE-EDUCATION: (see chart for minutes):  Exercise/activities per grid below to improve balance, coordination, kinesthetic sense, posture, pain, and proprioception. Required minimal visual, verbal and tactile cues to promote motor control of left, upper extremity(s). MANUAL THERAPY: (see chart for minutes): Joint mobilization, Soft tissue mobilization, skin mobilization, and muscle energy techniques were utilized and necessary because of the patient's restricted joint motion, restricted motion of soft tissue and pain . MODALITIES: (see chart for minutes):      *  Cold Pack Therapy in order to provide analgesia and reduce inflammation and edema. Date:3 5/27/21 (visit 21)  6/1/21 (visit 22)  6/3/21 (visit 23)  6/8/21 (visit 24)    Modalities: 15 min 15 min 15 min 15 min   Game ready to L shoulder, medium compression, coldest setting  15'  15' 15' 15'   Therapeutic Exercise: 40 min 40 min 43 min 38 min    UBE: 3'/3' L4  Wall flexion stretch: x5  DB flexion: 2x10x4#  DB abduction: 2x10x4#   DB reverse fly: 2x10x4#  TRX BW row: 2x6 from ground with knees bent, second set lower  Bird dog: 2x5 B  PU plus from plank position: 2x10  Depth limited PU from knees: 2x5   Ball throw into trampoline: 2x10   ER in abduction: 2x10x4#  Cross body stretch: 2x3 holds  Behind neck stretch: 2x3 holds  Behind back stretch: 2x3 holds  Doorway stretch: x3 holds UBE: 3'/3' L4  Wall flexion stretch: x5  DB flexion: 3x7x4#  DB abduction: 3x7x4#   DB reverse fly: 3x7x4#  TRX BW row: 2x5 from ground with knees bent, x5 from lying on ground  Bird dog: 2x5 B  PU plus from plank position: x6  Depth limited PU from knees: x8  Full PU with depth limited + PU plus: x4, x5  Ball throw into trampoline: 2x10   ER in abduction: 2x10x4#  Cross body stretch: 2x3 holds  Behind neck stretch: 2x3 holds  Behind back stretch: 2x3 holds  Doorway stretch: x3 holds  S/L ER: 2x10x4#  S/L abduction: 2x10x4#   Pt given printout of exercises and stretches including lower body stretch and shoulder stretches and exercises.  UBE: 3'/3' L4  PROM and stretching by clinician: x10' into ER and IR  Shoulder raises: 3x10x5# B anterior and laterally  Row: 10x25#, 10x35#, 10x45#   Lat pull-down in kneeling: 10x45#, [id number]#   Cable fly: 10x10# B, [id number]# B  Triceps pushdown: 1e93n41#   DB curls: 2t91g04# B  S/L ER: 2x10x3#   S/L abduction: 2x10x3#  Wall flexion stretch: x5  DB flexion: 2x10x5# B  DB abduction: 2x10x5# B   DB reverse fly: 2x10x5# B  TRX BW row: x5 from ground with knees bent, x5 from lying on ground  Bird dog: 2x5 B  Depth limited PU from knees: x6  Full PU with depth limited + PU plus: x6  Ball throw into trampoline: 2x10   ER in abduction: 2x10x5#  Cross body stretch: 2x3 holds  Behind neck stretch: 2x3 holds  Behind back stretch: 2x3 holds  Doorway stretch: x3 holds  S/L ER: 2x10x5#   L abduction: 2x10x5#   Neuromuscular re-education              Manual Therapy:              Therapeutic Activities:                Home program: 3/8/21: pendulum, pulley, elbow flexion/extension, shoulder circles, shoulder isometrics    [facility] Portal  Treatment/Session Summary:    · Response to Treatment: Pt has progressed well with physical therapy and has met all of his goals. He will be discharged at this time. · Communication/Consultation:  None today  · Equipment provided today: 3/8/21: doorway pulley.  4/21/21: blue band   · Recommendations: D/C to home program    Total Treatment Billable Duration: 53 minutes  PT Patient Time In/Time Out  Time In: 1015  Time Out: [address] Zander Yin, PT, DPT

## 2021-06-10 ENCOUNTER — APPOINTMENT (OUTPATIENT)
Dept: PHYSICAL THERAPY | Age: 56
End: 2021-06-10
Payer: COMMERCIAL

## 2021-09-10 PROBLEM — I65.23 ATHEROSCLEROSIS OF BOTH CAROTID ARTERIES: Status: ACTIVE | Noted: 2021-09-10

## 2021-09-14 ENCOUNTER — HOSPITAL ENCOUNTER (OUTPATIENT)
Dept: CT IMAGING | Age: 56
Discharge: HOME | End: 2021-09-14
Attending: INTERNAL MEDICINE
Payer: SELF-PAY

## 2021-09-14 DIAGNOSIS — E11.9 CONTROLLED TYPE 2 DIABETES MELLITUS WITHOUT COMPLICATION, WITHOUT LONG-TERM CURRENT USE OF INSULIN (HCC): ICD-10-CM

## 2021-09-14 DIAGNOSIS — I65.23 ATHEROSCLEROSIS OF BOTH CAROTID ARTERIES: ICD-10-CM

## 2021-09-14 DIAGNOSIS — I10 ESSENTIAL HYPERTENSION: ICD-10-CM

## 2021-09-14 DIAGNOSIS — E78.2 MIXED HYPERLIPIDEMIA: ICD-10-CM

## 2021-09-14 PROCEDURE — 75571 CT HRT W/O DYE W/CA TEST: CPT

## 2021-10-26 PROBLEM — R93.1 AGATSTON CORONARY ARTERY CALCIUM SCORE BETWEEN 100 AND 199: Status: ACTIVE | Noted: 2021-10-26

## 2022-03-18 PROBLEM — R93.1 AGATSTON CORONARY ARTERY CALCIUM SCORE BETWEEN 100 AND 199: Status: ACTIVE | Noted: 2021-10-26

## 2022-03-19 PROBLEM — I65.23 ATHEROSCLEROSIS OF BOTH CAROTID ARTERIES: Status: ACTIVE | Noted: 2021-09-10

## 2022-03-20 PROBLEM — E11.9 CONTROLLED TYPE 2 DIABETES MELLITUS WITHOUT COMPLICATION, WITHOUT LONG-TERM CURRENT USE OF INSULIN (HCC): Status: ACTIVE | Noted: 2019-01-03

## 2022-08-26 ENCOUNTER — HOSPITAL ENCOUNTER (OUTPATIENT)
Dept: LAB | Age: 57
Discharge: HOME | End: 2022-08-29

## 2022-09-23 ENCOUNTER — TELEPHONE (OUTPATIENT)
Dept: FAMILY MEDICINE CLINIC | Facility: CLINIC | Age: 57
End: 2022-09-23

## 2022-09-23 DIAGNOSIS — E11.9 CONTROLLED TYPE 2 DIABETES MELLITUS WITHOUT COMPLICATION, WITHOUT LONG-TERM CURRENT USE OF INSULIN (HCC): ICD-10-CM

## 2022-09-23 DIAGNOSIS — E78.2 MIXED HYPERLIPIDEMIA: ICD-10-CM

## 2022-09-23 DIAGNOSIS — I10 PRIMARY HYPERTENSION: Primary | ICD-10-CM

## 2022-11-23 ENCOUNTER — NURSE ONLY (OUTPATIENT)
Dept: FAMILY MEDICINE CLINIC | Facility: CLINIC | Age: 57
End: 2022-11-23

## 2022-11-23 DIAGNOSIS — E11.9 CONTROLLED TYPE 2 DIABETES MELLITUS WITHOUT COMPLICATION, WITHOUT LONG-TERM CURRENT USE OF INSULIN (HCC): ICD-10-CM

## 2022-11-23 DIAGNOSIS — I10 PRIMARY HYPERTENSION: ICD-10-CM

## 2022-11-23 DIAGNOSIS — E78.2 MIXED HYPERLIPIDEMIA: ICD-10-CM

## 2022-11-23 LAB
BASOPHILS # BLD: 0 K/UL (ref 0–0.2)
BASOPHILS NFR BLD: 1 % (ref 0–2)
DIFFERENTIAL METHOD BLD: ABNORMAL
EOSINOPHIL # BLD: 0.1 K/UL (ref 0–0.8)
EOSINOPHIL NFR BLD: 1 % (ref 0.5–7.8)
ERYTHROCYTE [DISTWIDTH] IN BLOOD BY AUTOMATED COUNT: 13.3 % (ref 11.9–14.6)
HCT VFR BLD AUTO: 43.3 % (ref 41.1–50.3)
HGB BLD-MCNC: 14 G/DL (ref 13.6–17.2)
IMM GRANULOCYTES # BLD AUTO: 0 K/UL (ref 0–0.5)
IMM GRANULOCYTES NFR BLD AUTO: 0 % (ref 0–5)
LYMPHOCYTES # BLD: 0.9 K/UL (ref 0.5–4.6)
LYMPHOCYTES NFR BLD: 12 % (ref 13–44)
MCH RBC QN AUTO: 31.5 PG (ref 26.1–32.9)
MCHC RBC AUTO-ENTMCNC: 32.3 G/DL (ref 31.4–35)
MCV RBC AUTO: 97.5 FL (ref 82–102)
MONOCYTES # BLD: 0.6 K/UL (ref 0.1–1.3)
MONOCYTES NFR BLD: 7 % (ref 4–12)
NEUTS SEG # BLD: 5.8 K/UL (ref 1.7–8.2)
NEUTS SEG NFR BLD: 79 % (ref 43–78)
NRBC # BLD: 0 K/UL (ref 0–0.2)
PLATELET # BLD AUTO: 257 K/UL (ref 150–450)
PMV BLD AUTO: 11.5 FL (ref 9.4–12.3)
RBC # BLD AUTO: 4.44 M/UL (ref 4.23–5.6)
WBC # BLD AUTO: 7.4 K/UL (ref 4.3–11.1)

## 2022-11-24 LAB
ALBUMIN SERPL-MCNC: 4.1 G/DL (ref 3.5–5)
ALBUMIN/GLOB SERPL: 1.3 {RATIO} (ref 0.4–1.6)
ALP SERPL-CCNC: 46 U/L (ref 50–136)
ALT SERPL-CCNC: 41 U/L (ref 12–65)
ANION GAP SERPL CALC-SCNC: 6 MMOL/L (ref 2–11)
AST SERPL-CCNC: 22 U/L (ref 15–37)
BILIRUB SERPL-MCNC: 0.5 MG/DL (ref 0.2–1.1)
BUN SERPL-MCNC: 27 MG/DL (ref 6–23)
CALCIUM SERPL-MCNC: 10.1 MG/DL (ref 8.3–10.4)
CHLORIDE SERPL-SCNC: 103 MMOL/L (ref 101–110)
CHOLEST SERPL-MCNC: 101 MG/DL
CO2 SERPL-SCNC: 28 MMOL/L (ref 21–32)
CREAT SERPL-MCNC: 1.2 MG/DL (ref 0.8–1.5)
EST. AVERAGE GLUCOSE BLD GHB EST-MCNC: 140 MG/DL
GLOBULIN SER CALC-MCNC: 3.1 G/DL (ref 2.8–4.5)
GLUCOSE SERPL-MCNC: 136 MG/DL (ref 65–100)
HBA1C MFR BLD: 6.5 % (ref 4.8–5.6)
HDLC SERPL-MCNC: 54 MG/DL (ref 40–60)
HDLC SERPL: 1.9 {RATIO}
LDLC SERPL CALC-MCNC: 34.2 MG/DL
POTASSIUM SERPL-SCNC: 4.4 MMOL/L (ref 3.5–5.1)
PROT SERPL-MCNC: 7.2 G/DL (ref 6.3–8.2)
SODIUM SERPL-SCNC: 137 MMOL/L (ref 133–143)
TRIGL SERPL-MCNC: 64 MG/DL (ref 35–150)
VLDLC SERPL CALC-MCNC: 12.8 MG/DL (ref 6–23)

## 2022-11-30 ENCOUNTER — OFFICE VISIT (OUTPATIENT)
Dept: FAMILY MEDICINE CLINIC | Facility: CLINIC | Age: 57
End: 2022-11-30
Payer: COMMERCIAL

## 2022-11-30 VITALS
WEIGHT: 215.8 LBS | HEIGHT: 72 IN | SYSTOLIC BLOOD PRESSURE: 119 MMHG | HEART RATE: 74 BPM | RESPIRATION RATE: 16 BRPM | DIASTOLIC BLOOD PRESSURE: 65 MMHG | BODY MASS INDEX: 29.23 KG/M2 | OXYGEN SATURATION: 97 % | TEMPERATURE: 97.2 F

## 2022-11-30 DIAGNOSIS — B00.1 FEVER BLISTER: ICD-10-CM

## 2022-11-30 DIAGNOSIS — N52.02 CORPORO-VENOUS OCCLUSIVE ERECTILE DYSFUNCTION: ICD-10-CM

## 2022-11-30 DIAGNOSIS — E11.9 CONTROLLED TYPE 2 DIABETES MELLITUS WITHOUT COMPLICATION, WITHOUT LONG-TERM CURRENT USE OF INSULIN (HCC): Primary | ICD-10-CM

## 2022-11-30 DIAGNOSIS — E78.2 MIXED HYPERLIPIDEMIA: ICD-10-CM

## 2022-11-30 DIAGNOSIS — I10 PRIMARY HYPERTENSION: ICD-10-CM

## 2022-11-30 PROCEDURE — 3078F DIAST BP <80 MM HG: CPT | Performed by: FAMILY MEDICINE

## 2022-11-30 PROCEDURE — 99214 OFFICE O/P EST MOD 30 MIN: CPT | Performed by: FAMILY MEDICINE

## 2022-11-30 PROCEDURE — 3044F HG A1C LEVEL LT 7.0%: CPT | Performed by: FAMILY MEDICINE

## 2022-11-30 PROCEDURE — 3074F SYST BP LT 130 MM HG: CPT | Performed by: FAMILY MEDICINE

## 2022-11-30 RX ORDER — ATORVASTATIN CALCIUM 10 MG/1
10 TABLET, FILM COATED ORAL DAILY
Qty: 90 TABLET | Refills: 3 | Status: SHIPPED | OUTPATIENT
Start: 2022-11-30

## 2022-11-30 RX ORDER — VALSARTAN 160 MG/1
160 TABLET ORAL DAILY
Qty: 90 TABLET | Refills: 3 | Status: SHIPPED | OUTPATIENT
Start: 2022-11-30

## 2022-11-30 RX ORDER — METFORMIN HYDROCHLORIDE 500 MG/1
1500 TABLET, EXTENDED RELEASE ORAL DAILY
Qty: 90 TABLET | Refills: 3 | Status: SHIPPED | OUTPATIENT
Start: 2022-11-30

## 2022-11-30 RX ORDER — HYDROCHLOROTHIAZIDE 25 MG/1
25 TABLET ORAL DAILY
Qty: 90 TABLET | Refills: 3 | Status: SHIPPED | OUTPATIENT
Start: 2022-11-30

## 2022-11-30 RX ORDER — SILDENAFIL 100 MG/1
100 TABLET, FILM COATED ORAL PRN
Qty: 30 TABLET | Refills: 3 | Status: SHIPPED | OUTPATIENT
Start: 2022-11-30

## 2022-11-30 RX ORDER — PIOGLITAZONEHYDROCHLORIDE 45 MG/1
45 TABLET ORAL DAILY
Qty: 90 TABLET | Refills: 3 | Status: SHIPPED | OUTPATIENT
Start: 2022-11-30

## 2022-11-30 RX ORDER — VALACYCLOVIR HYDROCHLORIDE 500 MG/1
TABLET, FILM COATED ORAL
Qty: 15 TABLET | Refills: 11 | Status: SHIPPED | OUTPATIENT
Start: 2022-11-30

## 2022-11-30 ASSESSMENT — PATIENT HEALTH QUESTIONNAIRE - PHQ9
SUM OF ALL RESPONSES TO PHQ QUESTIONS 1-9: 0
1. LITTLE INTEREST OR PLEASURE IN DOING THINGS: 0
SUM OF ALL RESPONSES TO PHQ9 QUESTIONS 1 & 2: 0
SUM OF ALL RESPONSES TO PHQ QUESTIONS 1-9: 0
2. FEELING DOWN, DEPRESSED OR HOPELESS: 0
SUM OF ALL RESPONSES TO PHQ QUESTIONS 1-9: 0
SUM OF ALL RESPONSES TO PHQ QUESTIONS 1-9: 0

## 2022-11-30 NOTE — PROGRESS NOTES
[address] [facility] [employer] [address]  Phone: [phone] Fax [phone]  Izaiah Hensley MD  11/30/2022         Mr. Daria Cannon  is a 62y.o.  year old  male patient who comes in to check on diabetes, hypertension, and high cholesterol. Checks blood sugars once a day. Sugars have been running better. He has been doing the Metformin and and Actos. . Last eye exam was 1/22. Feet have no problems. Did  have a flu shot this year. Did have a pneumonia shot Pneumovax 12/16. Did have a tetanus shot tdap 9/15. Has been working on diet and exercise. Blood pressure has been under control. Will with fever blisters and would like a medicine for that. He also would like a refill on Viagra. He has no other problems and does not take nitroglycerin. Mr. Daria Cannon  has  has a past medical history of Diabetes ([address] Diverticulosis of large intestine without diverticulitis, Elevated PSA, Hypertension, and Obesity (BMI 30-39.9). Mr. Daria Cannon  has  has a past surgical history that includes orthopedic surgery; lipoma resection; and Colonoscopy (last 4/4/16).     Mr. Daria Cannon   Current Outpatient Medications   Medication Sig Dispense Refill    atorvastatin (LIPITOR) 10 MG tablet Take 1 tablet by mouth daily 90 tablet 3    hydroCHLOROthiazide (HYDRODIURIL) 25 MG tablet Take 1 tablet by mouth daily 90 tablet 3    metFORMIN (GLUCOPHAGE-XR) 500 MG extended release tablet Take 3 tablets by mouth daily 90 tablet 3    pioglitazone (ACTOS) 45 MG tablet Take 1 tablet by mouth daily 90 tablet 3    valsartan (DIOVAN) 160 MG tablet Take 1 tablet by mouth daily 90 tablet 3    sildenafil (VIAGRA) 100 MG tablet Take 1 tablet by mouth as needed for Erectile Dysfunction 30 tablet 3    valACYclovir (VALTREX) 500 MG tablet 3 po at onset of fever blister 15 tablet 11    Lancets MISC Use to check sugars once daily      aspirin 81 MG EC tablet Take by mouth daily       No current facility-administered medications for this visit. Mr. Elizabeth Arredondo   Family History   Problem Relation Age of Onset    Hypertension Mother     Hypertension Sister     Cancer Father         liver    Heart Disease Neg Hx     Cancer Maternal Grandmother         liver    Cancer Maternal Aunt         breast 62s    Cancer Maternal Aunt         pancreatic    Diabetes Neg Hx        Mr. Mckinnon    Social History     Socioeconomic History    Marital status:      Spouse name: Not on file    Number of children: Not on file    Years of education: Not on file    Highest education level: Not on file   Occupational History    Not on file   Tobacco Use    Smoking status: Never    Smokeless tobacco: Never   Substance and Sexual Activity    Alcohol use: No     Alcohol/week: 0.0 standard drinks    Drug use: No    Sexual activity: Not on file   Other Topics Concern    Not on file   Social History Narrative    Not on file     Social Determinants of Health     Financial Resource Strain: Not on file   Food Insecurity: Not on file   Transportation Needs: Not on file   Physical Activity: Not on file   Stress: Not on file   Social Connections: Not on file   Intimate Partner Violence: Not on file   Housing Stability: Not on file       Mr. Elizabeth Arredondo  has the following allergies: Allergies   Allergen Reactions    Empagliflozin Other (See Comments)     Severe UTI    Metformin Diarrhea     PT DENIES       /65   Pulse 74   Temp 97.2 °F (36.2 °C)   Resp 16   Ht 6' (1.829 m)   Wt 215 lb 12.8 oz (97.9 kg)   SpO2 97%   BMI 29.27 kg/m²     HEENT: Normocephalic, atraumatic, pupils equal and reactive to light. Neck: Supple, no masses or thyromegaly. Lungs: clear to auscultation bilaterally. CV: regular rate and rhythm, without murmurs, rubs, or gallops.   Ext: No lower extremity edema,    Diabetic foot exam:   Left Foot:   Visual Exam: normal   Pulse DP: 2+ (normal)   Filament test: normal sensation   Vibratory Sensation: normal  Right Foot:   Visual Exam: normal   Pulse DP: 2+ (normal)   Filament test: normal sensation   Vibratory Sensation: normal    No results found for this visit on 11/30/22. Darci Lynch was seen today for diabetes. Diagnoses and all orders for this visit:    Controlled type 2 diabetes mellitus without complication, without long-term current use of insulin (HCC)  -     metFORMIN (GLUCOPHAGE-XR) 500 MG extended release tablet; Take 3 tablets by mouth daily  -     pioglitazone (ACTOS) 45 MG tablet; Take 1 tablet by mouth daily  -     Comprehensive Metabolic Panel; Future  -     Microalbumin / Creatinine Urine Ratio; Future  -     Lipid Panel; Future  -     Hemoglobin A1C; Future    Primary hypertension  -     hydroCHLOROthiazide (HYDRODIURIL) 25 MG tablet; Take 1 tablet by mouth daily  -     valsartan (DIOVAN) 160 MG tablet; Take 1 tablet by mouth daily    Mixed hyperlipidemia  -     atorvastatin (LIPITOR) 10 MG tablet; Take 1 tablet by mouth daily    Fever blister  -     valACYclovir (VALTREX) 500 MG tablet; 3 po at onset of fever blister    Corporo-venous occlusive erectile dysfunction  -     sildenafil (VIAGRA) 100 MG tablet; Take 1 tablet by mouth as needed for Erectile Dysfunction    Labs gone over with the patient today. Excellent diabetic control. Cholesterol is low so I would like to decrease his Lipitor. We will recheck this when he returns. Patient counseled on diet and exercise.     Rowan Tapia MD

## 2022-12-22 NOTE — DISCHARGE INSTRUCTIONS
Post-Operative Instructions   For  Shoulder Arthroscopy Rotator Cuff Repair  Phone:  [phone]    1. If you do not have an \"Iceman\" type cooling unit, for the first 48-72 hours following surgery, use ice on the shoulder every two hours (while awake) for 20-30 minutes at a time to help prevent swelling and lessen pain. If you have a cooling unit, follow the instructions given to you- continually as much as possible the first 48-72 hours, then 3-4 times a day for 4 weeks. 2. You may shower after the arthroscopy. Keep dressings in place for the first three days. After three days, you may remove the dressings and leave the steri-strip bandages in place; they will peel off naturally. 3. Stay in sling at all times except to shower. 4. Begin therapy as ordered. 5. Use any pain medication as instructed. You should take your pain medication as soon as you feel the anesthetic wearing off. Do not wait until you are in severe pain to begin taking your pain medication. 6. You may have some side effects from your pain medication. If you have nausea, try taking your medication with food. For itching, you may take over the counter Benadryl. 7. You may have been given a prescription for Zofran or Phenergan. This medication is used for nausea and vomiting. You do not need to get this prescription filled unless you have a problem. 8. If you have a problem, please call [address] [place] [address] at [phone]    [address] [place] [address], [address]     Call your doctor if pain is NOT relieved by medication.   Excessive bleeding that does not stop after holding pressure over the area  · Temperature of 101 degrees F or above  · Excessive redness, swelling or bruising, and/ or green or yellow, smelly discharge from incision      TYPICAL SIDE EFFECTS OF PAIN MEDICATION:     Constipation: Drink lots of fluids.  Over the counter stool softener if needed.    Nausea: Take pain medication with food. Call your doctor with persistent nausea. ACTIVITY  · As tolerated and as directed by your doctor. · Bathe or shower as directed by your doctor. DIET  · Day of surgery: Clear liquids until no nausea or vomiting; small portion, light diet [place] foods (ex: baked chicken, plain rice, grits, scrambled eggs, toast). Nothing greasy, fried or spicy today. · Advance to regular diet on second day, unless your doctor orders otherwise. · If nausea and vomiting continues, call your doctor. PAIN  · Take pain medication as directed by your doctor. · DO NOT take aspirin or blood thinners unless directed by your doctor. AFTER ANESTHESIA   · For the first 24 hours: DO NOT Drive, Drink alcoholic beverages, or Make important decisions. · Be aware of dizziness following anesthesia and while taking pain medication. DISCHARGE SUMMARY from Nurse    PATIENT INSTRUCTIONS:    After general anesthesia or intravenous sedation, for 24 hours or while taking prescription Narcotics:  · Limit your activities  · Do not drive and operate hazardous machinery  · Do not make important personal or business decisions  · Do  not drink alcoholic beverages  · If you have not urinated within 8 hours after discharge, please contact your surgeon on call. *  Please give a list of your current medications to your Primary Care Provider. *  Please update this list whenever your medications are discontinued, doses are      changed, or new medications (including over-the-counter products) are added. *  Please carry medication information at all times in case of emergency situations. Preventing Infection at Home  We care about preventing infection and avoiding the spread of germs - not only when you are in the hospital but also when you return home.  When you return home from the hospital, its important to take the following steps to help prevent infection and avoid spreading germs that could infect you and others. Ask everyone in your home to follow these guidelines, too. Clean Your Hands  · Clean your hands whenever your hands are visibly dirty, before you eat, before or after touching your mouth, nose or eyes, and before preparing food. Clean them after contact with body fluids, using the restroom, touching animals or changing diapers. · When washing hands, wet them with warm water and work up a lather. Rub hands for at least 15 seconds, then rinse them and pat them dry with a clean towel or paper towel. · When using hand sanitizers, it should take about 15 seconds to rub your hands dry. If not, you probably didnt apply enough . Cover Your Sneeze or Cough  Germs are released into the air whenever you sneeze or cough. To prevent the spread of infection:  · Turn away from other people before coughing or sneezing. · Cover your mouth or nose with a tissue when you cough or sneeze. Put the tissue in the trash. · If you dont have a tissue, cough or sneeze into your upper sleeve, not your hands. · Always clean your hands after coughing or sneezing. Care for Wounds  Your skin is your bodys first line of defense against germs, but an open wound leaves an easy way for germs to enter your body. To prevent infection:  · Clean your hands before and after changing wound dressings, and wear gloves to change dressings if recommended by your doctor. · Take special care with IV lines or other devices inserted into the body. If you must touch them, clean your hands first.  · Follow any specific instructions from your doctor to care for your wounds. Contact your doctor if you experience any signs of infection, such as fever or increased redness at the surgical or wound site. Keep a Clean Home  · Clean or wipe commonly touched hard surfaces like door handles, sinks, tabletops, phones and TV remotes.   · Use products labeled disinfectant to kill harmful bacteria and viruses. · Use a clean cloth or paper towel to clean and dry surfaces. Wiping surfaces with a dirty dishcloth, sponge or towel will only spread germs. · Never share toothbrushes, wheeler, drinking glasses, utensils, razor blades, face cloths or bath towels to avoid spreading germs. · Be sure that the linens that you sleep on are clean. · Keep pets away from wounds and wash your hands after touching pets, their toys or bedding. We care about you and your health. Remember, preventing infections is a team effort between you, your family, friends and health care providers. These are general instructions for a healthy lifestyle:    No smoking/ No tobacco products/ Avoid exposure to second hand smoke    Surgeon General's Warning:  Quitting smoking now greatly reduces serious risk to your health. Obesity, smoking, and sedentary lifestyle greatly increases your risk for illness    A healthy diet, regular physical exercise & weight monitoring are important for maintaining a healthy lifestyle    You may be retaining fluid if you have a history of heart failure or if you experience any of the following symptoms:  Weight gain of 3 pounds or more overnight or 5 pounds in a week, increased swelling in our hands or feet or shortness of breath while lying flat in bed. Please call your doctor as soon as you notice any of these symptoms; do not wait until your next office visit. Recognize signs and symptoms of STROKE:    F-face looks uneven    A-arms unable to move or move unevenly    S-speech slurred or non-existent    T-time-call 911 as soon as signs and symptoms begin-DO NOT go       Back to bed or wait to see if you get better-TIME IS BRAIN. Learning About Coronavirus [phone])  Coronavirus [phone]): Overview  What is coronavirus (COVID-19)? The coronavirus disease (COVID-19) is caused by a virus. It is an illness that was first found in [place], [place], in December 2019.  It has since spread worldwide. The virus can cause fever, cough, and trouble breathing. In severe cases, it can cause pneumonia and make it hard to breathe without help. It can cause death. Coronaviruses are a large group of viruses. They cause the common cold. They also cause more serious illnesses like Middle East respiratory syndrome (MERS) and severe acute respiratory syndrome (SARS). COVID-19 is caused by a novel coronavirus. That means it's a new type that has not been seen in people before. This virus spreads person-to-person through droplets from coughing and sneezing. It can also spread when you are close to someone who is infected. And it can spread when you touch something that has the virus on it, such as a doorknob or a tabletop. What can you do to protect yourself from coronavirus (COVID-19)? The best way to protect yourself from getting sick is to:  · Avoid areas where there is an outbreak. · Avoid contact with people who may be infected. · Wash your hands often with soap or alcohol-based hand sanitizers. · Avoid crowds and try to stay at least 6 feet away from other people. · Wash your hands often, especially after you cough or sneeze. Use soap and water, and scrub for at least 20 seconds. If soap and water aren't available, use an alcohol-based hand . · Avoid touching your mouth, nose, and eyes. What can you do to avoid spreading the virus to others? To help avoid spreading the virus to others:  · Cover your mouth with a tissue when you cough or sneeze. Then throw the tissue in the trash. · Use a disinfectant to clean things that you touch often. · Wear a cloth face cover if you have to go to public areas. · Stay home if you are sick or have been exposed to the virus. Don't go to school, work, or public areas. And don't use public transportation, ride-shares, or taxis unless you have no choice. · If you are sick:  ? Leave your home only if you need to get medical care.  But call the doctor's office first so they know you're coming. And wear a face cover.  ? Wear the face cover whenever you're around other people. It can help stop the spread of the virus when you cough or sneeze.  ? Clean and disinfect your home every day. Use household  and disinfectant wipes or sprays. Take special care to clean things that you grab with your hands. These include doorknobs, remote controls, phones, and handles on your refrigerator and microwave. And don't forget countertops, tabletops, bathrooms, and computer keyboards.  When to call for help  [id number] anytime you think you may need emergency care. For example, call if:  · You have severe trouble breathing. (You can't talk at all.)  · You have constant chest pain or pressure.  · You are severely dizzy or lightheaded.  · You are confused or can't think clearly.  · Your face and lips have a blue color.  · You pass out (lose consciousness) or are very hard to wake up.  Call your doctor now if you develop symptoms such as:  · Shortness of breath.  · Fever.  · Cough.  If you need to get care, call ahead to the doctor's office for instructions before you go. Make sure you wear a face cover to prevent exposing other people to the virus.  Where can you get the latest information?  The following health organizations are tracking and studying this virus. Their websites contain the most up-to-date information. You'll also learn what to do if you think you may have been exposed to the virus.  · U.S. Centers for Disease Control and Prevention (CDC): The CDC provides updated news about the disease and travel advice. The website also tells you how to prevent the spread of infection. www.cdc.gov  · World Health Organization (WHO): WHO offers information about the virus outbreaks. WHO also has travel advice. www.who.int  Current as of: May 8, 2020               Content Version: 12.5  © 3691-3278 Healthwise, Incorporated.   Care instructions adapted under license by [employer] (which disclaims liability or warranty for this information). If you have questions about a medical condition or this instruction, always ask your healthcare professional. [address] any warranty or liability for your use of this information. no

## 2023-05-24 DIAGNOSIS — E11.9 CONTROLLED TYPE 2 DIABETES MELLITUS WITHOUT COMPLICATION, WITHOUT LONG-TERM CURRENT USE OF INSULIN (HCC): ICD-10-CM

## 2023-05-24 RX ORDER — METFORMIN HYDROCHLORIDE 500 MG/1
1500 TABLET, EXTENDED RELEASE ORAL DAILY
Qty: 90 TABLET | Refills: 0 | Status: SHIPPED | OUTPATIENT
Start: 2023-05-24

## 2023-05-31 ENCOUNTER — NURSE ONLY (OUTPATIENT)
Dept: FAMILY MEDICINE CLINIC | Facility: CLINIC | Age: 58
End: 2023-05-31

## 2023-05-31 DIAGNOSIS — E11.9 CONTROLLED TYPE 2 DIABETES MELLITUS WITHOUT COMPLICATION, WITHOUT LONG-TERM CURRENT USE OF INSULIN (HCC): ICD-10-CM

## 2023-05-31 LAB
ALBUMIN SERPL-MCNC: 3.9 G/DL (ref 3.5–5)
ALBUMIN/GLOB SERPL: 1.2 (ref 0.4–1.6)
ALP SERPL-CCNC: 53 U/L (ref 50–136)
ALT SERPL-CCNC: 26 U/L (ref 12–65)
ANION GAP SERPL CALC-SCNC: 4 MMOL/L (ref 2–11)
AST SERPL-CCNC: 22 U/L (ref 15–37)
BILIRUB SERPL-MCNC: 0.5 MG/DL (ref 0.2–1.1)
BUN SERPL-MCNC: 25 MG/DL (ref 6–23)
CALCIUM SERPL-MCNC: 10 MG/DL (ref 8.3–10.4)
CHLORIDE SERPL-SCNC: 106 MMOL/L (ref 101–110)
CHOLEST SERPL-MCNC: 101 MG/DL
CO2 SERPL-SCNC: 29 MMOL/L (ref 21–32)
CREAT SERPL-MCNC: 1.2 MG/DL (ref 0.8–1.5)
CREAT UR-MCNC: 82 MG/DL
EST. AVERAGE GLUCOSE BLD GHB EST-MCNC: 148 MG/DL
GLOBULIN SER CALC-MCNC: 3.2 G/DL (ref 2.8–4.5)
GLUCOSE SERPL-MCNC: 150 MG/DL (ref 65–100)
HBA1C MFR BLD: 6.8 % (ref 4.8–5.6)
HDLC SERPL-MCNC: 50 MG/DL (ref 40–60)
HDLC SERPL: 2
LDLC SERPL CALC-MCNC: 34 MG/DL
MICROALBUMIN UR-MCNC: 0.62 MG/DL
MICROALBUMIN/CREAT UR-RTO: 8 MG/G (ref 0–30)
POTASSIUM SERPL-SCNC: 4.4 MMOL/L (ref 3.5–5.1)
PROT SERPL-MCNC: 7.1 G/DL (ref 6.3–8.2)
SODIUM SERPL-SCNC: 139 MMOL/L (ref 133–143)
TRIGL SERPL-MCNC: 85 MG/DL (ref 35–150)
VLDLC SERPL CALC-MCNC: 17 MG/DL (ref 6–23)

## 2023-06-05 ENCOUNTER — OFFICE VISIT (OUTPATIENT)
Dept: FAMILY MEDICINE CLINIC | Facility: CLINIC | Age: 58
End: 2023-06-05
Payer: COMMERCIAL

## 2023-06-05 VITALS
DIASTOLIC BLOOD PRESSURE: 64 MMHG | OXYGEN SATURATION: 98 % | SYSTOLIC BLOOD PRESSURE: 122 MMHG | RESPIRATION RATE: 16 BRPM | HEIGHT: 72 IN | TEMPERATURE: 98.6 F | HEART RATE: 68 BPM | BODY MASS INDEX: 29.82 KG/M2 | WEIGHT: 220.2 LBS

## 2023-06-05 DIAGNOSIS — E11.9 CONTROLLED TYPE 2 DIABETES MELLITUS WITHOUT COMPLICATION, WITHOUT LONG-TERM CURRENT USE OF INSULIN (HCC): Primary | ICD-10-CM

## 2023-06-05 DIAGNOSIS — I10 PRIMARY HYPERTENSION: ICD-10-CM

## 2023-06-05 DIAGNOSIS — E78.2 MIXED HYPERLIPIDEMIA: ICD-10-CM

## 2023-06-05 PROCEDURE — 99214 OFFICE O/P EST MOD 30 MIN: CPT | Performed by: FAMILY MEDICINE

## 2023-06-05 PROCEDURE — 3074F SYST BP LT 130 MM HG: CPT | Performed by: FAMILY MEDICINE

## 2023-06-05 PROCEDURE — 3044F HG A1C LEVEL LT 7.0%: CPT | Performed by: FAMILY MEDICINE

## 2023-06-05 PROCEDURE — 3078F DIAST BP <80 MM HG: CPT | Performed by: FAMILY MEDICINE

## 2023-06-05 RX ORDER — VALSARTAN 160 MG/1
160 TABLET ORAL DAILY
Qty: 90 TABLET | Refills: 3 | Status: SHIPPED | OUTPATIENT
Start: 2023-06-05

## 2023-06-05 RX ORDER — ATORVASTATIN CALCIUM 10 MG/1
10 TABLET, FILM COATED ORAL DAILY
Qty: 90 TABLET | Refills: 3 | Status: SHIPPED | OUTPATIENT
Start: 2023-06-05

## 2023-06-05 RX ORDER — METFORMIN HYDROCHLORIDE 500 MG/1
1500 TABLET, EXTENDED RELEASE ORAL DAILY
Qty: 270 TABLET | Refills: 3 | Status: SHIPPED | OUTPATIENT
Start: 2023-06-05

## 2023-06-05 RX ORDER — HYDROCHLOROTHIAZIDE 25 MG/1
25 TABLET ORAL DAILY
Qty: 90 TABLET | Refills: 3 | Status: SHIPPED | OUTPATIENT
Start: 2023-06-05

## 2023-06-05 RX ORDER — PIOGLITAZONEHYDROCHLORIDE 45 MG/1
45 TABLET ORAL DAILY
Qty: 90 TABLET | Refills: 3 | Status: SHIPPED | OUTPATIENT
Start: 2023-06-05

## 2023-06-05 SDOH — ECONOMIC STABILITY: FOOD INSECURITY: WITHIN THE PAST 12 MONTHS, THE FOOD YOU BOUGHT JUST DIDN'T LAST AND YOU DIDN'T HAVE MONEY TO GET MORE.: NEVER TRUE

## 2023-06-05 SDOH — ECONOMIC STABILITY: HOUSING INSECURITY
IN THE LAST 12 MONTHS, WAS THERE A TIME WHEN YOU DID NOT HAVE A STEADY PLACE TO SLEEP OR SLEPT IN A SHELTER (INCLUDING NOW)?: NO

## 2023-06-05 SDOH — ECONOMIC STABILITY: INCOME INSECURITY: HOW HARD IS IT FOR YOU TO PAY FOR THE VERY BASICS LIKE FOOD, HOUSING, MEDICAL CARE, AND HEATING?: NOT HARD AT ALL

## 2023-06-05 SDOH — ECONOMIC STABILITY: FOOD INSECURITY: WITHIN THE PAST 12 MONTHS, YOU WORRIED THAT YOUR FOOD WOULD RUN OUT BEFORE YOU GOT MONEY TO BUY MORE.: NEVER TRUE

## 2023-06-05 ASSESSMENT — PATIENT HEALTH QUESTIONNAIRE - PHQ9
SUM OF ALL RESPONSES TO PHQ9 QUESTIONS 1 & 2: 0
2. FEELING DOWN, DEPRESSED OR HOPELESS: 0
SUM OF ALL RESPONSES TO PHQ QUESTIONS 1-9: 0
SUM OF ALL RESPONSES TO PHQ QUESTIONS 1-9: 0
1. LITTLE INTEREST OR PLEASURE IN DOING THINGS: 0
SUM OF ALL RESPONSES TO PHQ QUESTIONS 1-9: 0
SUM OF ALL RESPONSES TO PHQ QUESTIONS 1-9: 0

## 2023-06-05 NOTE — PROGRESS NOTES
auscultation bilaterally. CV: regular rate and rhythm, without murmurs, rubs, or gallops. Abdomen: soft, nontender, nondistended, no masses. No CVAT tenderness. Ext: No lower extremity edema,    Diabetic foot exam:   Left Foot:   Visual Exam: normal   Pulse DP: 2+ (normal)   Filament test: normal sensation   Vibratory Sensation: normal  Right Foot:   Visual Exam: normal   Pulse DP: 2+ (normal)   Filament test: normal sensation   Vibratory Sensation: normal      Labs gone over. No results found for this visit on 06/05/23. Ruth Pinzon was seen today for diabetes. Diagnoses and all orders for this visit:    Controlled type 2 diabetes mellitus without complication, without long-term current use of insulin (HCC)  -     metFORMIN (GLUCOPHAGE-XR) 500 MG extended release tablet; Take 3 tablets by mouth daily  -     pioglitazone (ACTOS) 45 MG tablet; Take 1 tablet by mouth daily    Mixed hyperlipidemia  -     atorvastatin (LIPITOR) 10 MG tablet; Take 1 tablet by mouth daily    Primary hypertension  -     hydroCHLOROthiazide (HYDRODIURIL) 25 MG tablet; Take 1 tablet by mouth daily  -     valsartan (DIOVAN) 160 MG tablet; Take 1 tablet by mouth daily        Patient counseled on diet and exercise.     Natalie Stuart MD

## 2023-11-27 ENCOUNTER — TELEPHONE (OUTPATIENT)
Dept: FAMILY MEDICINE CLINIC | Facility: CLINIC | Age: 58
End: 2023-11-27

## 2023-11-27 DIAGNOSIS — E78.2 MIXED HYPERLIPIDEMIA: ICD-10-CM

## 2023-11-27 DIAGNOSIS — E11.9 CONTROLLED TYPE 2 DIABETES MELLITUS WITHOUT COMPLICATION, WITHOUT LONG-TERM CURRENT USE OF INSULIN (HCC): ICD-10-CM

## 2023-11-27 DIAGNOSIS — I10 PRIMARY HYPERTENSION: Primary | ICD-10-CM

## 2023-11-28 ENCOUNTER — NURSE ONLY (OUTPATIENT)
Dept: FAMILY MEDICINE CLINIC | Facility: CLINIC | Age: 58
End: 2023-11-28

## 2023-11-28 DIAGNOSIS — I10 PRIMARY HYPERTENSION: ICD-10-CM

## 2023-11-28 DIAGNOSIS — E78.2 MIXED HYPERLIPIDEMIA: ICD-10-CM

## 2023-11-28 DIAGNOSIS — E11.9 CONTROLLED TYPE 2 DIABETES MELLITUS WITHOUT COMPLICATION, WITHOUT LONG-TERM CURRENT USE OF INSULIN (HCC): ICD-10-CM

## 2023-11-28 LAB
ALBUMIN SERPL-MCNC: 3.9 G/DL (ref 3.5–5)
ALBUMIN/GLOB SERPL: 1.2 (ref 0.4–1.6)
ALP SERPL-CCNC: 59 U/L (ref 50–136)
ALT SERPL-CCNC: 25 U/L (ref 12–65)
ANION GAP SERPL CALC-SCNC: 5 MMOL/L (ref 2–11)
AST SERPL-CCNC: 19 U/L (ref 15–37)
BASOPHILS # BLD: 0 K/UL (ref 0–0.2)
BASOPHILS NFR BLD: 1 % (ref 0–2)
BILIRUB SERPL-MCNC: 0.7 MG/DL (ref 0.2–1.1)
BUN SERPL-MCNC: 29 MG/DL (ref 6–23)
CALCIUM SERPL-MCNC: 9.6 MG/DL (ref 8.3–10.4)
CHLORIDE SERPL-SCNC: 105 MMOL/L (ref 101–110)
CHOLEST SERPL-MCNC: 110 MG/DL
CO2 SERPL-SCNC: 27 MMOL/L (ref 21–32)
CREAT SERPL-MCNC: 1.3 MG/DL (ref 0.8–1.5)
DIFFERENTIAL METHOD BLD: NORMAL
EOSINOPHIL # BLD: 0.1 K/UL (ref 0–0.8)
EOSINOPHIL NFR BLD: 2 % (ref 0.5–7.8)
ERYTHROCYTE [DISTWIDTH] IN BLOOD BY AUTOMATED COUNT: 13.2 % (ref 11.9–14.6)
GLOBULIN SER CALC-MCNC: 3.3 G/DL (ref 2.8–4.5)
GLUCOSE SERPL-MCNC: 158 MG/DL (ref 65–100)
HCT VFR BLD AUTO: 42.4 % (ref 41.1–50.3)
HDLC SERPL-MCNC: 45 MG/DL (ref 40–60)
HDLC SERPL: 2.4
HGB BLD-MCNC: 14 G/DL (ref 13.6–17.2)
IMM GRANULOCYTES # BLD AUTO: 0 K/UL (ref 0–0.5)
IMM GRANULOCYTES NFR BLD AUTO: 0 % (ref 0–5)
LDLC SERPL CALC-MCNC: 44.8 MG/DL
LYMPHOCYTES # BLD: 1 K/UL (ref 0.5–4.6)
LYMPHOCYTES NFR BLD: 20 % (ref 13–44)
MCH RBC QN AUTO: 31.9 PG (ref 26.1–32.9)
MCHC RBC AUTO-ENTMCNC: 33 G/DL (ref 31.4–35)
MCV RBC AUTO: 96.6 FL (ref 82–102)
MONOCYTES # BLD: 0.5 K/UL (ref 0.1–1.3)
MONOCYTES NFR BLD: 11 % (ref 4–12)
NEUTS SEG # BLD: 3.3 K/UL (ref 1.7–8.2)
NEUTS SEG NFR BLD: 66 % (ref 43–78)
NRBC # BLD: 0 K/UL (ref 0–0.2)
PLATELET # BLD AUTO: 231 K/UL (ref 150–450)
PMV BLD AUTO: 11.6 FL (ref 9.4–12.3)
POTASSIUM SERPL-SCNC: 4.2 MMOL/L (ref 3.5–5.1)
PROT SERPL-MCNC: 7.2 G/DL (ref 6.3–8.2)
RBC # BLD AUTO: 4.39 M/UL (ref 4.23–5.6)
SODIUM SERPL-SCNC: 137 MMOL/L (ref 133–143)
TRIGL SERPL-MCNC: 101 MG/DL (ref 35–150)
VLDLC SERPL CALC-MCNC: 20.2 MG/DL (ref 6–23)
WBC # BLD AUTO: 4.9 K/UL (ref 4.3–11.1)

## 2023-11-29 LAB
EST. AVERAGE GLUCOSE BLD GHB EST-MCNC: 137 MG/DL
HBA1C MFR BLD: 6.4 % (ref 4.8–5.6)

## 2023-12-02 ASSESSMENT — PATIENT HEALTH QUESTIONNAIRE - PHQ9
SUM OF ALL RESPONSES TO PHQ QUESTIONS 1-9: 0
1. LITTLE INTEREST OR PLEASURE IN DOING THINGS: 0
1. LITTLE INTEREST OR PLEASURE IN DOING THINGS: NOT AT ALL
SUM OF ALL RESPONSES TO PHQ9 QUESTIONS 1 & 2: 0
2. FEELING DOWN, DEPRESSED OR HOPELESS: 0
SUM OF ALL RESPONSES TO PHQ QUESTIONS 1-9: 0
2. FEELING DOWN, DEPRESSED OR HOPELESS: NOT AT ALL
SUM OF ALL RESPONSES TO PHQ9 QUESTIONS 1 & 2: 0

## 2023-12-05 ENCOUNTER — OFFICE VISIT (OUTPATIENT)
Dept: FAMILY MEDICINE CLINIC | Facility: CLINIC | Age: 58
End: 2023-12-05
Payer: COMMERCIAL

## 2023-12-05 VITALS
BODY MASS INDEX: 29.93 KG/M2 | RESPIRATION RATE: 16 BRPM | SYSTOLIC BLOOD PRESSURE: 134 MMHG | WEIGHT: 221 LBS | HEART RATE: 66 BPM | TEMPERATURE: 97.5 F | DIASTOLIC BLOOD PRESSURE: 66 MMHG | OXYGEN SATURATION: 99 % | HEIGHT: 72 IN

## 2023-12-05 DIAGNOSIS — E78.2 MIXED HYPERLIPIDEMIA: ICD-10-CM

## 2023-12-05 DIAGNOSIS — I10 PRIMARY HYPERTENSION: ICD-10-CM

## 2023-12-05 DIAGNOSIS — E11.9 CONTROLLED TYPE 2 DIABETES MELLITUS WITHOUT COMPLICATION, WITHOUT LONG-TERM CURRENT USE OF INSULIN (HCC): ICD-10-CM

## 2023-12-05 DIAGNOSIS — I65.23 ATHEROSCLEROSIS OF BOTH CAROTID ARTERIES: ICD-10-CM

## 2023-12-05 DIAGNOSIS — Z00.00 ROUTINE GENERAL MEDICAL EXAMINATION AT A HEALTH CARE FACILITY: Primary | ICD-10-CM

## 2023-12-05 DIAGNOSIS — B00.1 FEVER BLISTER: ICD-10-CM

## 2023-12-05 PROCEDURE — 3078F DIAST BP <80 MM HG: CPT | Performed by: FAMILY MEDICINE

## 2023-12-05 PROCEDURE — 3075F SYST BP GE 130 - 139MM HG: CPT | Performed by: FAMILY MEDICINE

## 2023-12-05 PROCEDURE — 99396 PREV VISIT EST AGE 40-64: CPT | Performed by: FAMILY MEDICINE

## 2023-12-05 RX ORDER — LANCETS 30 GAUGE
EACH MISCELLANEOUS
Qty: 100 EACH | Refills: 3 | Status: SHIPPED | OUTPATIENT
Start: 2023-12-05

## 2023-12-05 RX ORDER — GLUCOSAMINE HCL/CHONDROITIN SU 500-400 MG
CAPSULE ORAL
Qty: 100 STRIP | Refills: 3 | Status: SHIPPED | OUTPATIENT
Start: 2023-12-05

## 2023-12-05 RX ORDER — VALACYCLOVIR HYDROCHLORIDE 500 MG/1
TABLET, FILM COATED ORAL
Qty: 15 TABLET | Refills: 11 | Status: SHIPPED | OUTPATIENT
Start: 2023-12-05

## 2023-12-07 ENCOUNTER — PATIENT MESSAGE (OUTPATIENT)
Dept: FAMILY MEDICINE CLINIC | Facility: CLINIC | Age: 58
End: 2023-12-07

## 2023-12-07 DIAGNOSIS — Z12.5 SCREENING FOR MALIGNANT NEOPLASM OF PROSTATE: Primary | ICD-10-CM

## 2023-12-07 NOTE — TELEPHONE ENCOUNTER
From: Thersea Delgado  To: Dr. Khushi Box: 12/7/2023 10:31 AM EST  Subject: Lab results     Good morning Doctor Avtar. Did the lab test my psa level? If so where would I see that on the results? If it was not tested I would need to come back asa for that . My new insurance for 2024 requires one for my cancer policy. Thank you.

## 2024-07-08 DIAGNOSIS — E11.9 CONTROLLED TYPE 2 DIABETES MELLITUS WITHOUT COMPLICATION, WITHOUT LONG-TERM CURRENT USE OF INSULIN (HCC): ICD-10-CM

## 2024-07-08 RX ORDER — METFORMIN HYDROCHLORIDE 500 MG/1
1500 TABLET, EXTENDED RELEASE ORAL DAILY
Qty: 270 TABLET | Refills: 0 | Status: SHIPPED | OUTPATIENT
Start: 2024-07-08

## 2024-08-08 ENCOUNTER — TELEPHONE (OUTPATIENT)
Dept: FAMILY MEDICINE CLINIC | Facility: CLINIC | Age: 59
End: 2024-08-08

## 2024-08-08 DIAGNOSIS — E11.9 CONTROLLED TYPE 2 DIABETES MELLITUS WITHOUT COMPLICATION, WITHOUT LONG-TERM CURRENT USE OF INSULIN (HCC): ICD-10-CM

## 2024-08-08 DIAGNOSIS — E78.2 MIXED HYPERLIPIDEMIA: ICD-10-CM

## 2024-08-08 DIAGNOSIS — I10 PRIMARY HYPERTENSION: Primary | ICD-10-CM

## 2024-08-26 DIAGNOSIS — E78.2 MIXED HYPERLIPIDEMIA: ICD-10-CM

## 2024-08-26 DIAGNOSIS — I10 PRIMARY HYPERTENSION: ICD-10-CM

## 2024-08-26 DIAGNOSIS — E11.9 CONTROLLED TYPE 2 DIABETES MELLITUS WITHOUT COMPLICATION, WITHOUT LONG-TERM CURRENT USE OF INSULIN (HCC): ICD-10-CM

## 2024-08-27 RX ORDER — PIOGLITAZONEHYDROCHLORIDE 45 MG/1
45 TABLET ORAL DAILY
Qty: 90 TABLET | Refills: 0 | Status: SHIPPED | OUTPATIENT
Start: 2024-08-27

## 2024-08-27 RX ORDER — VALSARTAN 160 MG/1
160 TABLET ORAL DAILY
Qty: 90 TABLET | Refills: 0 | Status: SHIPPED | OUTPATIENT
Start: 2024-08-27

## 2024-08-27 RX ORDER — HYDROCHLOROTHIAZIDE 25 MG/1
25 TABLET ORAL DAILY
Qty: 90 TABLET | Refills: 0 | Status: SHIPPED | OUTPATIENT
Start: 2024-08-27

## 2024-08-27 RX ORDER — ATORVASTATIN CALCIUM 10 MG/1
10 TABLET, FILM COATED ORAL DAILY
Qty: 90 TABLET | Refills: 0 | Status: SHIPPED | OUTPATIENT
Start: 2024-08-27

## 2024-10-25 SDOH — ECONOMIC STABILITY: TRANSPORTATION INSECURITY
IN THE PAST 12 MONTHS, HAS LACK OF TRANSPORTATION KEPT YOU FROM MEETINGS, WORK, OR FROM GETTING THINGS NEEDED FOR DAILY LIVING?: NO

## 2024-10-25 SDOH — ECONOMIC STABILITY: INCOME INSECURITY: HOW HARD IS IT FOR YOU TO PAY FOR THE VERY BASICS LIKE FOOD, HOUSING, MEDICAL CARE, AND HEATING?: NOT HARD AT ALL

## 2024-10-25 SDOH — ECONOMIC STABILITY: FOOD INSECURITY: WITHIN THE PAST 12 MONTHS, THE FOOD YOU BOUGHT JUST DIDN'T LAST AND YOU DIDN'T HAVE MONEY TO GET MORE.: NEVER TRUE

## 2024-10-25 SDOH — ECONOMIC STABILITY: FOOD INSECURITY: WITHIN THE PAST 12 MONTHS, YOU WORRIED THAT YOUR FOOD WOULD RUN OUT BEFORE YOU GOT MONEY TO BUY MORE.: NEVER TRUE

## 2024-10-25 ASSESSMENT — PATIENT HEALTH QUESTIONNAIRE - PHQ9
SUM OF ALL RESPONSES TO PHQ9 QUESTIONS 1 & 2: 0
SUM OF ALL RESPONSES TO PHQ9 QUESTIONS 1 & 2: 0
SUM OF ALL RESPONSES TO PHQ QUESTIONS 1-9: 0
SUM OF ALL RESPONSES TO PHQ QUESTIONS 1-9: 0
2. FEELING DOWN, DEPRESSED OR HOPELESS: NOT AT ALL
SUM OF ALL RESPONSES TO PHQ QUESTIONS 1-9: 0
1. LITTLE INTEREST OR PLEASURE IN DOING THINGS: NOT AT ALL
1. LITTLE INTEREST OR PLEASURE IN DOING THINGS: NOT AT ALL
SUM OF ALL RESPONSES TO PHQ QUESTIONS 1-9: 0
2. FEELING DOWN, DEPRESSED OR HOPELESS: NOT AT ALL

## 2024-10-28 ENCOUNTER — OFFICE VISIT (OUTPATIENT)
Dept: FAMILY MEDICINE CLINIC | Facility: CLINIC | Age: 59
End: 2024-10-28

## 2024-10-28 VITALS
TEMPERATURE: 98.2 F | HEART RATE: 60 BPM | BODY MASS INDEX: 30.2 KG/M2 | SYSTOLIC BLOOD PRESSURE: 122 MMHG | HEIGHT: 72 IN | DIASTOLIC BLOOD PRESSURE: 65 MMHG | WEIGHT: 223 LBS | OXYGEN SATURATION: 98 % | RESPIRATION RATE: 16 BRPM

## 2024-10-28 DIAGNOSIS — E11.9 CONTROLLED TYPE 2 DIABETES MELLITUS WITHOUT COMPLICATION, WITHOUT LONG-TERM CURRENT USE OF INSULIN (HCC): Primary | ICD-10-CM

## 2024-10-28 DIAGNOSIS — I65.23 ATHEROSCLEROSIS OF BOTH CAROTID ARTERIES: ICD-10-CM

## 2024-10-28 DIAGNOSIS — I10 PRIMARY HYPERTENSION: ICD-10-CM

## 2024-10-28 DIAGNOSIS — E78.2 MIXED HYPERLIPIDEMIA: ICD-10-CM

## 2024-10-28 DIAGNOSIS — B00.1 FEVER BLISTER: ICD-10-CM

## 2024-10-28 PROCEDURE — 99214 OFFICE O/P EST MOD 30 MIN: CPT | Performed by: FAMILY MEDICINE

## 2024-10-28 PROCEDURE — 3074F SYST BP LT 130 MM HG: CPT | Performed by: FAMILY MEDICINE

## 2024-10-28 PROCEDURE — 3044F HG A1C LEVEL LT 7.0%: CPT | Performed by: FAMILY MEDICINE

## 2024-10-28 PROCEDURE — 3078F DIAST BP <80 MM HG: CPT | Performed by: FAMILY MEDICINE

## 2024-10-28 RX ORDER — HYDROCHLOROTHIAZIDE 25 MG/1
25 TABLET ORAL DAILY
Qty: 90 TABLET | Refills: 3 | Status: SHIPPED | OUTPATIENT
Start: 2024-10-28

## 2024-10-28 RX ORDER — VALSARTAN 160 MG/1
160 TABLET ORAL DAILY
Qty: 90 TABLET | Refills: 3 | Status: SHIPPED | OUTPATIENT
Start: 2024-10-28

## 2024-10-28 RX ORDER — VALACYCLOVIR HYDROCHLORIDE 500 MG/1
TABLET, FILM COATED ORAL
Qty: 15 TABLET | Refills: 11 | Status: SHIPPED | OUTPATIENT
Start: 2024-10-28

## 2024-10-28 RX ORDER — PIOGLITAZONEHYDROCHLORIDE 45 MG/1
45 TABLET ORAL DAILY
Qty: 90 TABLET | Refills: 3 | Status: SHIPPED | OUTPATIENT
Start: 2024-10-28

## 2024-10-28 RX ORDER — METFORMIN HYDROCHLORIDE 500 MG/1
1500 TABLET, EXTENDED RELEASE ORAL DAILY
Qty: 270 TABLET | Refills: 3 | Status: SHIPPED | OUTPATIENT
Start: 2024-10-28

## 2024-10-28 RX ORDER — ATORVASTATIN CALCIUM 10 MG/1
10 TABLET, FILM COATED ORAL DAILY
Qty: 90 TABLET | Refills: 3 | Status: SHIPPED | OUTPATIENT
Start: 2024-10-28

## 2024-10-28 RX ORDER — LANCETS 30 GAUGE
EACH MISCELLANEOUS
Qty: 100 EACH | Refills: 3 | Status: CANCELLED | OUTPATIENT
Start: 2024-10-28

## 2024-10-28 RX ORDER — GLUCOSAMINE HCL/CHONDROITIN SU 500-400 MG
CAPSULE ORAL
Qty: 100 STRIP | Refills: 3 | Status: CANCELLED | OUTPATIENT
Start: 2024-10-28

## 2024-10-28 NOTE — PROGRESS NOTES
[facility] [place] [place] [address]  Phone: [phone] Fax [phone]  Cristel Nova MD  10/28/2024         Mr. Mckinnon  is a 59 y.o.  year old  male patient who comes in to check on diabetes, hypertension, and high cholesterol.  Checks blood sugars once a day. Sugars have been running better. He has been doing the Metformin and and Actos.Last eye exam was 1/23. Feet have no problems. Did not have a flu shot this year. Did have a pneumonia shot Pneumovax 12/16. Did have a tetanus shot tdap 9/15. Has been working on diet and exercise. Blood pressure has been under control.  He does have atherosclerosis of both carotid arteries and has not had an ultrasound on them in about 3 years.  He is not interested in a flu shot today.       Mr. Mckinnon  has  has a past medical history of Diabetes (HCC), Diverticulosis of large intestine without diverticulitis, Elevated PSA, Hypertension, and Obesity (BMI 30-39.9).    Mr. Mckinnon  has  has a past surgical history that includes orthopedic surgery; lipoma resection; and Colonoscopy (last 4/4/16).    Mr. Mckinnon   Current Outpatient Medications   Medication Sig Dispense Refill    atorvastatin (LIPITOR) 10 MG tablet Take 1 tablet by mouth daily 90 tablet 3    hydroCHLOROthiazide (HYDRODIURIL) 25 MG tablet Take 1 tablet by mouth daily 90 tablet 3    metFORMIN (GLUCOPHAGE-XR) 500 MG extended release tablet Take 3 tablets by mouth daily 270 tablet 3    pioglitazone (ACTOS) 45 MG tablet Take 1 tablet by mouth daily 90 tablet 3    valACYclovir (VALTREX) 500 MG tablet 3 po at onset of fever blister 15 tablet 11    valsartan (DIOVAN) 160 MG tablet Take 1 tablet by mouth daily 90 tablet 3    blood glucose monitor strips Test sugars once daily 100 strip 3    Lancets MISC Use to check sugars once daily 100 each 3    Lancets MISC Use to check sugars once daily       No current facility-administered medications for this visit.       Mr. Mckinnon

## 2024-12-30 ASSESSMENT — ENCOUNTER SYMPTOMS
NAUSEA: 0
SWOLLEN GLANDS: 0
DIARRHEA: 0
SORE THROAT: 0
WHEEZING: 0
ABDOMINAL PAIN: 0
SINUS PAIN: 0
VOMITING: 0
RHINORRHEA: 1

## 2024-12-31 ENCOUNTER — OFFICE VISIT (OUTPATIENT)
Dept: FAMILY MEDICINE CLINIC | Facility: CLINIC | Age: 59
End: 2024-12-31

## 2024-12-31 VITALS
RESPIRATION RATE: 16 BRPM | BODY MASS INDEX: 30.07 KG/M2 | TEMPERATURE: 97.9 F | SYSTOLIC BLOOD PRESSURE: 133 MMHG | OXYGEN SATURATION: 96 % | HEIGHT: 72 IN | WEIGHT: 222 LBS | DIASTOLIC BLOOD PRESSURE: 67 MMHG | HEART RATE: 76 BPM

## 2024-12-31 DIAGNOSIS — B96.89 ACUTE BACTERIAL SINUSITIS: Primary | ICD-10-CM

## 2024-12-31 DIAGNOSIS — E78.2 MIXED HYPERLIPIDEMIA: ICD-10-CM

## 2024-12-31 DIAGNOSIS — J01.90 ACUTE BACTERIAL SINUSITIS: Primary | ICD-10-CM

## 2024-12-31 DIAGNOSIS — J20.9 ACUTE BRONCHITIS, UNSPECIFIED ORGANISM: ICD-10-CM

## 2024-12-31 DIAGNOSIS — E11.9 CONTROLLED TYPE 2 DIABETES MELLITUS WITHOUT COMPLICATION, WITHOUT LONG-TERM CURRENT USE OF INSULIN (HCC): ICD-10-CM

## 2024-12-31 DIAGNOSIS — I10 PRIMARY HYPERTENSION: ICD-10-CM

## 2024-12-31 PROCEDURE — 3078F DIAST BP <80 MM HG: CPT | Performed by: FAMILY MEDICINE

## 2024-12-31 PROCEDURE — 3044F HG A1C LEVEL LT 7.0%: CPT | Performed by: FAMILY MEDICINE

## 2024-12-31 PROCEDURE — 99212 OFFICE O/P EST SF 10 MIN: CPT | Performed by: FAMILY MEDICINE

## 2024-12-31 PROCEDURE — 3075F SYST BP GE 130 - 139MM HG: CPT | Performed by: FAMILY MEDICINE

## 2024-12-31 RX ORDER — AMOXICILLIN 875 MG/1
875 TABLET, COATED ORAL 2 TIMES DAILY
Qty: 20 TABLET | Refills: 0 | Status: SHIPPED | OUTPATIENT
Start: 2024-12-31 | End: 2025-01-10

## 2024-12-31 ASSESSMENT — ENCOUNTER SYMPTOMS
BACK PAIN: 0
COUGH: 1
CONSTIPATION: 0
SINUS PRESSURE: 0
SHORTNESS OF BREATH: 0
COLOR CHANGE: 0

## 2024-12-31 NOTE — PROGRESS NOTES
HISTORY OF PRESENT ILLNESS  Chief Complaint   Patient presents with    Cough     Congestion since 12/14    Ear Pain     Right       NOTICE FOR THE PATIENT: This clinical note is not designed to be interpreted by patients.  We do not recommend reading it unless you have medical training. These notes may contain candid and (unintentionally) offensive descriptions, which are sometimes required for accurate documentation. If you would like more information about your healthcare, please obtain it directly by myself or my staff/colleagues - never solely from the notes. Thank you for your understanding and cooperation.       Congestion since 12/14  Right Ear Pain  Tried mucinex and alkaseltzer cold.  Mostly in the chest and the R ear has been bothering him.  Worse the last 5 days.    Brownish yellow mucous.    History of Present Illness  The patient presents for evaluation of sinus congestion.    He reports a recent travel history of 18 days, during which he fell ill approximately 2 days after arrival. He self-medicated with over-the-counter cold remedies such as Mucinex, but these did not alleviate his symptoms. Approximately 7 to 8 days into his illness, he developed chest congestion that was unresponsive to treatment. He also experienced mild discomfort in his right ear, which has significantly worsened over the past 5 days. He describes a productive cough with brownish-yellow mucus, which occasionally provides some relief. He reports no fevers, sweating, nausea, vomiting, diarrhea, sore throat, wheezing, or shortness of breath. He does report frequent headaches. He also reports an incident of back injury due to a fall. He recalls an episode of hearing loss in his right ear during a flight from [place], which was accompanied by popping sounds. He has a history of sensorineural hearing loss in his left ear, which is also associated with cracking and popping sounds. He reports that his sinus congestion has

## 2025-04-24 ENCOUNTER — LAB (OUTPATIENT)
Dept: FAMILY MEDICINE CLINIC | Facility: CLINIC | Age: 60
End: 2025-04-24

## 2025-04-24 DIAGNOSIS — E11.9 CONTROLLED TYPE 2 DIABETES MELLITUS WITHOUT COMPLICATION, WITHOUT LONG-TERM CURRENT USE OF INSULIN (HCC): ICD-10-CM

## 2025-04-24 LAB
ALBUMIN SERPL-MCNC: 4.1 G/DL (ref 3.2–4.6)
ALBUMIN/GLOB SERPL: 1.3 (ref 1–1.9)
ALP SERPL-CCNC: 63 U/L (ref 40–129)
ALT SERPL-CCNC: 21 U/L (ref 8–55)
ANION GAP SERPL CALC-SCNC: 12 MMOL/L (ref 7–16)
AST SERPL-CCNC: 29 U/L (ref 15–37)
BILIRUB SERPL-MCNC: 0.6 MG/DL (ref 0–1.2)
BUN SERPL-MCNC: 38 MG/DL (ref 8–23)
CALCIUM SERPL-MCNC: 9.5 MG/DL (ref 8.8–10.2)
CHLORIDE SERPL-SCNC: 101 MMOL/L (ref 98–107)
CHOLEST SERPL-MCNC: 152 MG/DL (ref 0–200)
CO2 SERPL-SCNC: 23 MMOL/L (ref 20–29)
CREAT SERPL-MCNC: 1.3 MG/DL (ref 0.8–1.3)
CREAT UR-MCNC: 94.6 MG/DL (ref 39–259)
EST. AVERAGE GLUCOSE BLD GHB EST-MCNC: 151 MG/DL
GLOBULIN SER CALC-MCNC: 3.1 G/DL (ref 2.3–3.5)
GLUCOSE SERPL-MCNC: 132 MG/DL (ref 70–99)
HBA1C MFR BLD: 6.9 % (ref 0–5.6)
HDLC SERPL-MCNC: 43 MG/DL (ref 40–60)
HDLC SERPL: 3.6 (ref 0–5)
LDLC SERPL CALC-MCNC: 89 MG/DL (ref 0–100)
MICROALBUMIN UR-MCNC: <1.2 MG/DL (ref 0–20)
MICROALBUMIN/CREAT UR-RTO: NORMAL MG/G (ref 0–30)
POTASSIUM SERPL-SCNC: 4.2 MMOL/L (ref 3.5–5.1)
PROT SERPL-MCNC: 7.1 G/DL (ref 6.3–8.2)
SODIUM SERPL-SCNC: 135 MMOL/L (ref 136–145)
TRIGL SERPL-MCNC: 104 MG/DL (ref 0–150)
VLDLC SERPL CALC-MCNC: 21 MG/DL (ref 6–23)

## 2025-04-25 SDOH — ECONOMIC STABILITY: FOOD INSECURITY: WITHIN THE PAST 12 MONTHS, YOU WORRIED THAT YOUR FOOD WOULD RUN OUT BEFORE YOU GOT MONEY TO BUY MORE.: NEVER TRUE

## 2025-04-25 SDOH — ECONOMIC STABILITY: INCOME INSECURITY: IN THE LAST 12 MONTHS, WAS THERE A TIME WHEN YOU WERE NOT ABLE TO PAY THE MORTGAGE OR RENT ON TIME?: NO

## 2025-04-25 SDOH — ECONOMIC STABILITY: FOOD INSECURITY: WITHIN THE PAST 12 MONTHS, THE FOOD YOU BOUGHT JUST DIDN'T LAST AND YOU DIDN'T HAVE MONEY TO GET MORE.: NEVER TRUE

## 2025-04-25 SDOH — ECONOMIC STABILITY: TRANSPORTATION INSECURITY
IN THE PAST 12 MONTHS, HAS THE LACK OF TRANSPORTATION KEPT YOU FROM MEDICAL APPOINTMENTS OR FROM GETTING MEDICATIONS?: NO

## 2025-04-25 ASSESSMENT — PATIENT HEALTH QUESTIONNAIRE - PHQ9
SUM OF ALL RESPONSES TO PHQ QUESTIONS 1-9: 0
1. LITTLE INTEREST OR PLEASURE IN DOING THINGS: NOT AT ALL
SUM OF ALL RESPONSES TO PHQ QUESTIONS 1-9: 0
SUM OF ALL RESPONSES TO PHQ QUESTIONS 1-9: 0
SUM OF ALL RESPONSES TO PHQ9 QUESTIONS 1 & 2: 0
2. FEELING DOWN, DEPRESSED OR HOPELESS: NOT AT ALL
SUM OF ALL RESPONSES TO PHQ QUESTIONS 1-9: 0
1. LITTLE INTEREST OR PLEASURE IN DOING THINGS: NOT AT ALL
2. FEELING DOWN, DEPRESSED OR HOPELESS: NOT AT ALL

## 2025-04-28 ENCOUNTER — OFFICE VISIT (OUTPATIENT)
Dept: FAMILY MEDICINE CLINIC | Facility: CLINIC | Age: 60
End: 2025-04-28

## 2025-04-28 VITALS
RESPIRATION RATE: 18 BRPM | SYSTOLIC BLOOD PRESSURE: 123 MMHG | BODY MASS INDEX: 29.74 KG/M2 | TEMPERATURE: 97.2 F | HEIGHT: 72 IN | OXYGEN SATURATION: 99 % | WEIGHT: 219.6 LBS | DIASTOLIC BLOOD PRESSURE: 67 MMHG | HEART RATE: 59 BPM

## 2025-04-28 DIAGNOSIS — Z12.5 SCREENING FOR MALIGNANT NEOPLASM OF PROSTATE: ICD-10-CM

## 2025-04-28 DIAGNOSIS — I65.23 ATHEROSCLEROSIS OF BOTH CAROTID ARTERIES: ICD-10-CM

## 2025-04-28 DIAGNOSIS — Z12.11 SCREENING FOR MALIGNANT NEOPLASM OF COLON: ICD-10-CM

## 2025-04-28 DIAGNOSIS — E11.9 CONTROLLED TYPE 2 DIABETES MELLITUS WITHOUT COMPLICATION, WITHOUT LONG-TERM CURRENT USE OF INSULIN (HCC): Primary | ICD-10-CM

## 2025-04-28 DIAGNOSIS — I10 PRIMARY HYPERTENSION: ICD-10-CM

## 2025-04-28 DIAGNOSIS — E78.2 MIXED HYPERLIPIDEMIA: ICD-10-CM

## 2025-04-28 PROCEDURE — 3074F SYST BP LT 130 MM HG: CPT | Performed by: FAMILY MEDICINE

## 2025-04-28 PROCEDURE — 3044F HG A1C LEVEL LT 7.0%: CPT | Performed by: FAMILY MEDICINE

## 2025-04-28 PROCEDURE — 99214 OFFICE O/P EST MOD 30 MIN: CPT | Performed by: FAMILY MEDICINE

## 2025-04-28 PROCEDURE — 3078F DIAST BP <80 MM HG: CPT | Performed by: FAMILY MEDICINE

## 2025-04-28 RX ORDER — METFORMIN HYDROCHLORIDE 500 MG/1
1500 TABLET, EXTENDED RELEASE ORAL DAILY
Qty: 270 TABLET | Refills: 3 | Status: SHIPPED | OUTPATIENT
Start: 2025-04-28

## 2025-04-28 RX ORDER — ATORVASTATIN CALCIUM 10 MG/1
10 TABLET, FILM COATED ORAL DAILY
Qty: 90 TABLET | Refills: 3 | Status: SHIPPED | OUTPATIENT
Start: 2025-04-28

## 2025-04-28 RX ORDER — HYDROCHLOROTHIAZIDE 25 MG/1
25 TABLET ORAL DAILY
Qty: 90 TABLET | Refills: 3 | Status: CANCELLED | OUTPATIENT
Start: 2025-04-28

## 2025-04-28 RX ORDER — PIOGLITAZONE 45 MG/1
45 TABLET ORAL DAILY
Qty: 90 TABLET | Refills: 3 | Status: SHIPPED | OUTPATIENT
Start: 2025-04-28

## 2025-04-28 RX ORDER — VALSARTAN 160 MG/1
160 TABLET ORAL DAILY
Qty: 90 TABLET | Refills: 3 | Status: SHIPPED | OUTPATIENT
Start: 2025-04-28

## 2025-04-28 NOTE — PROGRESS NOTES
[facility] [place] [address]  Phone: [phone] Fax [phone]  Cristel Nova MD  4/28/2025         Mr. Mckinnon  is a 60 y.o.  year old  male patient who comes in to check on diabetes, hypertension, and high cholesterol.   History of Present Illness  The patient presents for a follow-up of diabetes, hypertension, and hyperlipidemia.    Diabetes management includes dietary modifications and physical exercise, such as walking 4 miles daily. Intermittent fasting has been incorporated into his regimen, with meals at 12 PM and 6 PM, and a protein shake at 3 PM. Fasting blood glucose levels have been in the 90s. Despite these efforts, a recent A1c reading of 6.9 has caused disappointment. No current issues with metformin are reported, although initial difficulties were experienced when starting the medication years ago. Pioglitazone is tolerated well. An upcoming eye examination is scheduled for next month. Occasional numbness in the hand during sleep is noted, but overall well-being is reported. The regimen of three metformin tablets was independently reduced to two, believing that an improved diet would compensate for the decreased dosage.    Occasional dizziness during physical activities such as golf has prompted blood pressure monitoring, with a recorded reading of 100/50 on one occasion. Current medications for blood pressure management include valsartan 160 mg and hydrochlorothiazide 25 mg.    Atorvastatin 10 mg is taken for cholesterol management.    A colonoscopy is due, having not undergone the procedure in the past decade. Interest in receiving the shingles vaccine and consideration of the pneumonia vaccine is expressed. Would like a PSA next visit.      Mr. Mckinnon  has  has a past medical history of Diabetes (HCC), Diverticulosis of large intestine without diverticulitis, Elevated PSA, Hypertension, and Obesity (BMI 30-39.9).    Mr. Mckinnon  has  has a

## 2025-06-26 ENCOUNTER — OFFICE VISIT (OUTPATIENT)
Dept: FAMILY MEDICINE CLINIC | Facility: CLINIC | Age: 60
End: 2025-06-26

## 2025-06-26 VITALS
OXYGEN SATURATION: 97 % | BODY MASS INDEX: 30.91 KG/M2 | HEIGHT: 72 IN | HEART RATE: 65 BPM | DIASTOLIC BLOOD PRESSURE: 63 MMHG | WEIGHT: 228.2 LBS | TEMPERATURE: 98.3 F | SYSTOLIC BLOOD PRESSURE: 130 MMHG

## 2025-06-26 DIAGNOSIS — L23.7 POISON IVY DERMATITIS: Primary | ICD-10-CM

## 2025-06-26 PROCEDURE — 3075F SYST BP GE 130 - 139MM HG: CPT

## 2025-06-26 PROCEDURE — 99213 OFFICE O/P EST LOW 20 MIN: CPT

## 2025-06-26 PROCEDURE — 3078F DIAST BP <80 MM HG: CPT

## 2025-06-26 PROCEDURE — 96372 THER/PROPH/DIAG INJ SC/IM: CPT

## 2025-06-26 RX ORDER — TRIAMCINOLONE ACETONIDE 40 MG/ML
40 INJECTION, SUSPENSION INTRA-ARTICULAR; INTRAMUSCULAR ONCE
Status: COMPLETED | OUTPATIENT
Start: 2025-06-26 | End: 2025-06-26

## 2025-06-26 RX ADMIN — TRIAMCINOLONE ACETONIDE 40 MG: 40 INJECTION, SUSPENSION INTRA-ARTICULAR; INTRAMUSCULAR at 11:35

## 2025-07-23 ENCOUNTER — E-VISIT (OUTPATIENT)
Dept: FAMILY MEDICINE CLINIC | Facility: CLINIC | Age: 60
End: 2025-07-23

## 2025-07-23 DIAGNOSIS — J01.90 ACUTE NON-RECURRENT SINUSITIS, UNSPECIFIED LOCATION: Primary | ICD-10-CM

## 2025-07-25 ENCOUNTER — OFFICE VISIT (OUTPATIENT)
Dept: FAMILY MEDICINE CLINIC | Facility: CLINIC | Age: 60
End: 2025-07-25

## 2025-07-25 VITALS
RESPIRATION RATE: 16 BRPM | OXYGEN SATURATION: 98 % | HEART RATE: 64 BPM | SYSTOLIC BLOOD PRESSURE: 142 MMHG | DIASTOLIC BLOOD PRESSURE: 74 MMHG | BODY MASS INDEX: 30.85 KG/M2 | WEIGHT: 227.8 LBS | HEIGHT: 72 IN | TEMPERATURE: 97.6 F

## 2025-07-25 DIAGNOSIS — J01.00 ACUTE NON-RECURRENT MAXILLARY SINUSITIS: ICD-10-CM

## 2025-07-25 DIAGNOSIS — R05.1 ACUTE COUGH: Primary | ICD-10-CM

## 2025-07-25 LAB
EXP DATE SOLUTION: NORMAL
EXP DATE SWAB: NORMAL
EXPIRATION DATE: NORMAL
INFLUENZA A RNA, POC: NORMAL
INFLUENZA B RNA, POC: NORMAL
LOT NUMBER POC: NORMAL
LOT NUMBER SOLUTION: NORMAL
LOT NUMBER SWAB: NORMAL
SARS-COV-2 RNA, POC: NEGATIVE
VALID INTERNAL CONTROL: NORMAL

## 2025-07-25 RX ORDER — DOXYCYCLINE HYCLATE 100 MG
100 TABLET ORAL 2 TIMES DAILY
Qty: 20 TABLET | Refills: 0 | Status: SHIPPED | OUTPATIENT
Start: 2025-07-25 | End: 2025-08-04

## 2025-07-25 RX ORDER — AMOXICILLIN 875 MG/1
875 TABLET, COATED ORAL 2 TIMES DAILY
Qty: 20 TABLET | Refills: 0 | Status: SHIPPED | OUTPATIENT
Start: 2025-07-25 | End: 2025-08-04

## 2025-07-25 ASSESSMENT — LIFESTYLE VARIABLES: SMOKING_STATUS: NO, I'VE NEVER SMOKED

## 2025-07-25 ASSESSMENT — PATIENT HEALTH QUESTIONNAIRE - PHQ9
2. FEELING DOWN, DEPRESSED OR HOPELESS: NOT AT ALL
SUM OF ALL RESPONSES TO PHQ QUESTIONS 1-9: 0
1. LITTLE INTEREST OR PLEASURE IN DOING THINGS: NOT AT ALL

## 2025-07-25 ASSESSMENT — ENCOUNTER SYMPTOMS
CHEST TIGHTNESS: 0
SORE THROAT: 0
SHORTNESS OF BREATH: 0
RHINORRHEA: 1
COUGH: 1
SINUS PRESSURE: 1
SINUS PAIN: 1

## 2025-07-25 NOTE — PROGRESS NOTES
Ernesto Mckinnon  1965 initiated an asynchronous digital communication through [employer].    HPI: per patient questionnaire     Exam: not applicable    Diagnoses and all orders for this visit:  Diagnoses and all orders for this visit:    Acute non-recurrent sinusitis, unspecified location  -     amoxicillin (AMOXIL) 875 MG tablet; Take 1 tablet by mouth 2 times daily for 10 days           Time: EV1 - 5-10 minutes were spent on the digital evaluation and management of this patient.      Anson Tapia MD

## 2025-07-25 NOTE — PROGRESS NOTES
[facility] [address] [place] [address]  Phone [phone]      Patient: Ernesto Mckinnon  YOB: 1965  Age 60 y.o.  Sex male  Medical Record:  [id number]  Visit Date: 07/25/25  Author:  Alfredo Sanz PA-C    Family Practice Clinic Note    Chief Complaint   Patient presents with    Ear Pain     Started Saturday when traveling back from [place]     Congestion    Cough    Sinus Problem       History of Present Illness  This is a 60-year-old male who presents today with complaints of respiratory symptoms which have been present now for approximately a week.  Recently traveled to [place].  Grandchild was ill around the same time.  He reports mild nasal congestion with sinus pressure and discomfort, primarily in the right maxillary area.  He has had some pressure in his right ear as well but denies discharge from the [facility].  Denies fever or chills.  He is developing a worsening cough with chest congestion.  Cough is productive.  He denies shortness of breath, wheeze or hemoptysis.  Denies bloody nasal discharge.    Past History:    Past Medical history   Past Medical History:   Diagnosis Date    Diabetes (HCC) dx 2012    oral agents.avg fasting 160; A1C= 7.4 in Feb 2-016- does not check daily    Diverticulosis of large intestine without diverticulitis 2016    Elevated PSA     Hypertension     Obesity (BMI 30-39.9) 30.6       Current Problem List:   Patient Active Problem List   Diagnosis    Agatston coronary artery calcium score between 100 and 199    Hyperlipidemia    Atherosclerosis of both carotid arteries    HTN (hypertension)    Controlled type 2 diabetes mellitus without complication, without long-term current use of insulin (HCC)    Fever blister       Current Medications: .  Current Outpatient Medications   Medication Sig Dispense Refill    doxycycline hyclate (VIBRA-TABS) 100 MG tablet Take 1 tablet by mouth 2 times daily for 10 days 20 tablet 0    atorvastatin

## (undated) DEVICE — STERILE POLYISOPRENE POWDER-FREE SURGICAL GLOVES: Brand: PROTEXIS

## (undated) DEVICE — SHEET, ORTHO, SPLIT, STERILE: Brand: MEDLINE

## (undated) DEVICE — PREP SKN CHLRAPRP APL 26ML STR --

## (undated) DEVICE — BANDAGE COBAN 4 IN COMPR W4INXL5YD FOAM COHESIVE QUIK STK SELF ADH SFT

## (undated) DEVICE — OUTFLOW CASSETTE TUBING, DO NOT USE IF PACKAGE IS DAMAGED: Brand: CROSSFLOW

## (undated) DEVICE — 4-PORT MANIFOLD: Brand: NEPTUNE 2

## (undated) DEVICE — [AGGRESSIVE 6-FLUTE BARREL BUR, ARTHROSCOPIC SHAVER BLADE,  DO NOT RESTERILIZE,  DO NOT USE IF PACKAGE IS DAMAGED,  KEEP DRY,  KEEP AWAY FROM SUNLIGHT]: Brand: FORMULA

## (undated) DEVICE — CANNULA THREADED FLEX 6.5 X 72MM: Brand: CLEAR-TRAC

## (undated) DEVICE — ULTRATAPE SUTURE COBRAID BLUE, 6                                    PER BOX: Brand: ULTRATAPE

## (undated) DEVICE — ULTRATAPE 2MM BLUE 38 PKG OF 6

## (undated) DEVICE — WEREWOLF FLOW 90 COBLATION WAND: Brand: COBLATION

## (undated) DEVICE — STOCKINETTE ORTH W9XL36IN COT 2 PLY HLLW FOR HANDLING LMB

## (undated) DEVICE — CLEAR-TRAC 7.0 MM X 72 MM THREADED                                    CANNULA, WITH DISPOSABLE OBTURATOR,                                    GREY, STERILE: Brand: CLEAR-TRAC

## (undated) DEVICE — SYR 50ML LR LCK 1ML GRAD NSAF --

## (undated) DEVICE — SUTURE MCRYL SZ 3-0 L27IN ABSRB UD L19MM PS-2 3/8 CIR PRIM Y427H

## (undated) DEVICE — GARMENT,MEDLINE,DVT,INT,CALF,MED, GEN2: Brand: MEDLINE

## (undated) DEVICE — STOCKINETTE,IMPERVIOUS,12X48,STERILE: Brand: MEDLINE

## (undated) DEVICE — STRIP,CLOSURE,WOUND,MEDI-STRIP,1/2X4: Brand: MEDLINE

## (undated) DEVICE — DRAPE SHT 3 QTR PROXIMA 53X77 --

## (undated) DEVICE — SHOULDER ARTHRO DR BAUMGARTEN: Brand: MEDLINE INDUSTRIES, INC.

## (undated) DEVICE — ACUFEX ACCESS POSITIONING KIT: Brand: ACUFEX

## (undated) DEVICE — SOLUTION IRRIG 3000ML 0.9% SOD CHL FLX CONT 0797208] ICU MEDICAL INC]

## (undated) DEVICE — MASTISOL ADHESIVE LIQ 2/3ML

## (undated) DEVICE — INFLOW CASSETTE TUBING, DO NOT USE IF PACKAGE IS DAMAGED: Brand: CROSSFLOW

## (undated) DEVICE — HEALICOIL REGENESORB 5.5 MM                                    THREADED DILATOR, DISPOSABLE: Brand: HEALICOIL

## (undated) DEVICE — FIRSTPASS ST SUTURE PASSER, STANDARD: Brand: FIRSTPASS

## (undated) DEVICE — SOFT SILICONE HYDROCELLULAR FOAM DRESSING WITH LOCK AWAY LAYER: Brand: ALLEVYN LIFE XL 21X21 CTN10

## (undated) DEVICE — INTENDED FOR TISSUE SEPARATION, AND OTHER PROCEDURES THAT REQUIRE A SHARP SURGICAL BLADE TO PUNCTURE OR CUT.: Brand: BARD-PARKER ® STAINLESS STEEL BLADES

## (undated) DEVICE — BLADE SHV CUT MENIS AGG + 4MM --

## (undated) DEVICE — 90-S ACCELERATOR, SUCTION PROBE, NON-BENDABLE, MAX CUT LEVEL 11: Brand: SERFAS ENERGY

## (undated) DEVICE — 3M™ IOBAN™ 2 ANTIMICROBIAL INCISE DRAPE 6650EZ: Brand: IOBAN™ 2

## (undated) DEVICE — PACK,SHOULDER,DRAPE,POUCH: Brand: MEDLINE

## (undated) DEVICE — NDL SPNE QNCKE 18GX3.5IN LF --